# Patient Record
Sex: MALE | Race: WHITE | NOT HISPANIC OR LATINO | Employment: OTHER | URBAN - METROPOLITAN AREA
[De-identification: names, ages, dates, MRNs, and addresses within clinical notes are randomized per-mention and may not be internally consistent; named-entity substitution may affect disease eponyms.]

---

## 2021-01-25 NOTE — PATIENT INSTRUCTIONS
Health Maintenance Due   Topic Date Due   • COLONOSCOPY  1968   • ANNUAL PHYSICAL  10/25/1971   • TDAP/TD VACCINES (1 - Tdap) 10/25/1987   • ZOSTER VACCINE (1 of 2) 10/25/2018   • INFLUENZA VACCINE  08/01/2020   • HEPATITIS C SCREENING  01/25/2021     Ask wife  Where flu shot given.      Take Zinc 1 hour before or two hours after other meds.  Stop Ibuprofen till   EGD

## 2021-01-26 ENCOUNTER — OFFICE VISIT (OUTPATIENT)
Dept: FAMILY MEDICINE CLINIC | Facility: CLINIC | Age: 51
End: 2021-01-26

## 2021-01-26 VITALS
HEIGHT: 72 IN | DIASTOLIC BLOOD PRESSURE: 81 MMHG | WEIGHT: 246 LBS | TEMPERATURE: 97.1 F | OXYGEN SATURATION: 94 % | HEART RATE: 83 BPM | BODY MASS INDEX: 33.32 KG/M2 | SYSTOLIC BLOOD PRESSURE: 119 MMHG

## 2021-01-26 DIAGNOSIS — Z00.01 ENCOUNTER FOR ANNUAL GENERAL MEDICAL EXAMINATION WITH ABNORMAL FINDINGS IN ADULT: Primary | ICD-10-CM

## 2021-01-26 DIAGNOSIS — Z12.11 SCREENING FOR COLON CANCER: ICD-10-CM

## 2021-01-26 DIAGNOSIS — Z12.11 COLON CANCER SCREENING: ICD-10-CM

## 2021-01-26 DIAGNOSIS — I15.9 SECONDARY HYPERTENSION: ICD-10-CM

## 2021-01-26 DIAGNOSIS — R68.82 LIBIDO, DECREASED: ICD-10-CM

## 2021-01-26 DIAGNOSIS — M1A.0790 CHRONIC GOUT OF FOOT, UNSPECIFIED CAUSE, UNSPECIFIED LATERALITY: ICD-10-CM

## 2021-01-26 DIAGNOSIS — Z13.1 SCREENING FOR DIABETES MELLITUS: ICD-10-CM

## 2021-01-26 DIAGNOSIS — F19.10 SUBSTANCE ABUSE (HCC): ICD-10-CM

## 2021-01-26 DIAGNOSIS — Z13.220 SCREENING CHOLESTEROL LEVEL: ICD-10-CM

## 2021-01-26 DIAGNOSIS — E55.9 VITAMIN D DEFICIENCY: ICD-10-CM

## 2021-01-26 DIAGNOSIS — K21.9 GASTROESOPHAGEAL REFLUX DISEASE, UNSPECIFIED WHETHER ESOPHAGITIS PRESENT: ICD-10-CM

## 2021-01-26 DIAGNOSIS — E11.9 DM TYPE 2, GOAL A1C TO BE DETERMINED (HCC): ICD-10-CM

## 2021-01-26 PROBLEM — M10.9 GOUT OF FOOT: Status: ACTIVE | Noted: 2021-01-26

## 2021-01-26 LAB
25(OH)D3 SERPL-MCNC: 26.2 NG/ML (ref 30–100)
ALBUMIN SERPL-MCNC: 4.4 G/DL (ref 3.5–5.2)
ALBUMIN/GLOB SERPL: 1.2 G/DL
ALP SERPL-CCNC: 144 U/L (ref 39–117)
ALT SERPL W P-5'-P-CCNC: 20 U/L (ref 1–41)
ANION GAP SERPL CALCULATED.3IONS-SCNC: 9.6 MMOL/L (ref 5–15)
AST SERPL-CCNC: 18 U/L (ref 1–40)
BASOPHILS # BLD AUTO: 0.07 10*3/MM3 (ref 0–0.2)
BASOPHILS NFR BLD AUTO: 0.9 % (ref 0–1.5)
BILIRUB SERPL-MCNC: 0.3 MG/DL (ref 0–1.2)
BUN SERPL-MCNC: 11 MG/DL (ref 6–20)
BUN/CREAT SERPL: 11.5 (ref 7–25)
CALCIUM SPEC-SCNC: 9.8 MG/DL (ref 8.6–10.5)
CHLORIDE SERPL-SCNC: 105 MMOL/L (ref 98–107)
CHOLEST SERPL-MCNC: 221 MG/DL (ref 0–200)
CO2 SERPL-SCNC: 26.4 MMOL/L (ref 22–29)
CREAT SERPL-MCNC: 0.96 MG/DL (ref 0.76–1.27)
DEPRECATED RDW RBC AUTO: 43.8 FL (ref 37–54)
EOSINOPHIL # BLD AUTO: 0.32 10*3/MM3 (ref 0–0.4)
EOSINOPHIL NFR BLD AUTO: 4.3 % (ref 0.3–6.2)
ERYTHROCYTE [DISTWIDTH] IN BLOOD BY AUTOMATED COUNT: 13.9 % (ref 12.3–15.4)
GFR SERPL CREATININE-BSD FRML MDRD: 82 ML/MIN/1.73
GLOBULIN UR ELPH-MCNC: 3.6 GM/DL
GLUCOSE SERPL-MCNC: 104 MG/DL (ref 65–99)
HCT VFR BLD AUTO: 48.7 % (ref 37.5–51)
HDLC SERPL-MCNC: 39 MG/DL (ref 40–60)
HGB BLD-MCNC: 16.3 G/DL (ref 13–17.7)
IMM GRANULOCYTES # BLD AUTO: 0.02 10*3/MM3 (ref 0–0.05)
IMM GRANULOCYTES NFR BLD AUTO: 0.3 % (ref 0–0.5)
LDLC SERPL CALC-MCNC: 150 MG/DL (ref 0–100)
LDLC/HDLC SERPL: 3.75 {RATIO}
LYMPHOCYTES # BLD AUTO: 2.04 10*3/MM3 (ref 0.7–3.1)
LYMPHOCYTES NFR BLD AUTO: 27.2 % (ref 19.6–45.3)
MAGNESIUM SERPL-MCNC: 2.2 MG/DL (ref 1.6–2.6)
MCH RBC QN AUTO: 29.1 PG (ref 26.6–33)
MCHC RBC AUTO-ENTMCNC: 33.5 G/DL (ref 31.5–35.7)
MCV RBC AUTO: 87 FL (ref 79–97)
MONOCYTES # BLD AUTO: 0.66 10*3/MM3 (ref 0.1–0.9)
MONOCYTES NFR BLD AUTO: 8.8 % (ref 5–12)
NEUTROPHILS NFR BLD AUTO: 4.4 10*3/MM3 (ref 1.7–7)
NEUTROPHILS NFR BLD AUTO: 58.5 % (ref 42.7–76)
NRBC BLD AUTO-RTO: 0 /100 WBC (ref 0–0.2)
PLATELET # BLD AUTO: 349 10*3/MM3 (ref 140–450)
PMV BLD AUTO: 10.8 FL (ref 6–12)
POTASSIUM SERPL-SCNC: 4.6 MMOL/L (ref 3.5–5.2)
PROT SERPL-MCNC: 8 G/DL (ref 6–8.5)
RBC # BLD AUTO: 5.6 10*6/MM3 (ref 4.14–5.8)
SODIUM SERPL-SCNC: 141 MMOL/L (ref 136–145)
TESTOST SERPL-MCNC: 341 NG/DL (ref 193–740)
TRIGL SERPL-MCNC: 178 MG/DL (ref 0–150)
TSH SERPL DL<=0.05 MIU/L-ACNC: 3.15 UIU/ML (ref 0.27–4.2)
VIT B12 BLD-MCNC: 686 PG/ML (ref 211–946)
VLDLC SERPL-MCNC: 32 MG/DL (ref 5–40)
WBC # BLD AUTO: 7.51 10*3/MM3 (ref 3.4–10.8)

## 2021-01-26 PROCEDURE — 83036 HEMOGLOBIN GLYCOSYLATED A1C: CPT | Performed by: PREVENTIVE MEDICINE

## 2021-01-26 PROCEDURE — 80061 LIPID PANEL: CPT | Performed by: PREVENTIVE MEDICINE

## 2021-01-26 PROCEDURE — 80053 COMPREHEN METABOLIC PANEL: CPT | Performed by: PREVENTIVE MEDICINE

## 2021-01-26 PROCEDURE — 82570 ASSAY OF URINE CREATININE: CPT | Performed by: PREVENTIVE MEDICINE

## 2021-01-26 PROCEDURE — 85025 COMPLETE CBC W/AUTO DIFF WBC: CPT | Performed by: PREVENTIVE MEDICINE

## 2021-01-26 PROCEDURE — 83735 ASSAY OF MAGNESIUM: CPT | Performed by: PREVENTIVE MEDICINE

## 2021-01-26 PROCEDURE — 82043 UR ALBUMIN QUANTITATIVE: CPT | Performed by: PREVENTIVE MEDICINE

## 2021-01-26 PROCEDURE — 82306 VITAMIN D 25 HYDROXY: CPT | Performed by: PREVENTIVE MEDICINE

## 2021-01-26 PROCEDURE — 99214 OFFICE O/P EST MOD 30 MIN: CPT | Performed by: PREVENTIVE MEDICINE

## 2021-01-26 PROCEDURE — 99386 PREV VISIT NEW AGE 40-64: CPT | Performed by: PREVENTIVE MEDICINE

## 2021-01-26 PROCEDURE — 84443 ASSAY THYROID STIM HORMONE: CPT | Performed by: PREVENTIVE MEDICINE

## 2021-01-26 PROCEDURE — 84403 ASSAY OF TOTAL TESTOSTERONE: CPT | Performed by: PREVENTIVE MEDICINE

## 2021-01-26 PROCEDURE — 82607 VITAMIN B-12: CPT | Performed by: PREVENTIVE MEDICINE

## 2021-01-26 RX ORDER — ALLOPURINOL 100 MG/1
TABLET ORAL
COMMUNITY
Start: 2020-10-22 | End: 2021-10-12 | Stop reason: SDUPTHER

## 2021-01-26 RX ORDER — DAPAGLIFLOZIN 10 MG/1
TABLET, FILM COATED ORAL
COMMUNITY
Start: 2021-01-17 | End: 2021-04-11 | Stop reason: SDUPTHER

## 2021-01-26 RX ORDER — ASPIRIN 81 MG/1
81 TABLET ORAL DAILY
COMMUNITY
End: 2022-02-01

## 2021-01-26 RX ORDER — AMLODIPINE BESYLATE 10 MG/1
10 TABLET ORAL DAILY
Qty: 90 TABLET | Refills: 1 | Status: SHIPPED | OUTPATIENT
Start: 2021-01-26 | End: 2021-07-23

## 2021-01-26 RX ORDER — AMLODIPINE BESYLATE 10 MG/1
TABLET ORAL
COMMUNITY
Start: 2020-10-28 | End: 2021-01-26 | Stop reason: SDUPTHER

## 2021-01-26 RX ORDER — MULTIVIT WITH MINERALS/LUTEIN
500 TABLET ORAL DAILY
COMMUNITY
End: 2021-04-15

## 2021-01-26 RX ORDER — CLONIDINE HYDROCHLORIDE 0.1 MG/1
TABLET ORAL
COMMUNITY
Start: 2020-10-28

## 2021-01-26 RX ORDER — IBUPROFEN 600 MG/1
TABLET ORAL
COMMUNITY
Start: 2020-10-29 | End: 2021-01-26

## 2021-01-26 RX ORDER — OMEPRAZOLE 40 MG/1
CAPSULE, DELAYED RELEASE ORAL
COMMUNITY
Start: 2020-10-22 | End: 2021-01-28 | Stop reason: SDUPTHER

## 2021-01-26 RX ORDER — SILDENAFIL 25 MG/1
TABLET, FILM COATED ORAL
COMMUNITY
Start: 2020-12-28 | End: 2021-10-08

## 2021-01-26 RX ORDER — AMLODIPINE BESYLATE 5 MG/1
TABLET ORAL
COMMUNITY
Start: 2020-10-22 | End: 2021-01-26

## 2021-01-26 NOTE — PROGRESS NOTES
"Brenda Zelaya is a 50 y.o. male presents for   Chief Complaint   Patient presents with   • Establish Care       Health Maintenance Due   Topic Date Due   • URINE MICROALBUMIN  1968   • COLONOSCOPY  1968   • ANNUAL PHYSICAL  10/25/1971   • Pneumococcal Vaccine 0-64 (1 of 1 - PPSV23) 10/25/1974   • Hepatitis B (1 of 3 - Risk 3-dose series) 10/25/1987   • TDAP/TD VACCINES (1 - Tdap) 10/25/1987   • ZOSTER VACCINE (1 of 2) 10/25/2018   • INFLUENZA VACCINE  08/01/2020   • DIABETIC FOOT EXAM  01/25/2021   • HEMOGLOBIN A1C  01/25/2021   • DIABETIC EYE EXAM  01/25/2021       50-year-old white male new patient states that he has been addicted to multiple substances.  He states that he has undergone inpatient treatment for a month last during the year last year and he is no longer using he did overdosed twice on heroin last year.  He is unclear whether or not he is type II diabetic.  He has had persistent problems with esophagitis.  He does take ibuprofen on a routine basis and has been cautioned not to do this.  He has agreed to colonoscopy and EGD and will undergo blood work today.  Patient has been told to wear his seatbelt and to use sunscreen.       Vitals:    01/26/21 0940 01/26/21 0942   BP: 132/79 119/81   BP Location: Right arm Left arm   Patient Position: Sitting Sitting   Cuff Size: Adult Adult   Pulse: 83    Temp: 97.1 °F (36.2 °C)    SpO2: 94%    Weight: 112 kg (246 lb)    Height: 182 cm (71.65\")      Body mass index is 33.69 kg/m².    Current Outpatient Medications on File Prior to Visit   Medication Sig Dispense Refill   • aspirin 81 MG EC tablet Take 81 mg by mouth Daily.     • vitamin C (ASCORBIC ACID) 250 MG tablet Take 500 mg by mouth Daily.     • Zinc 50 MG capsule Take  by mouth.     • allopurinol (ZYLOPRIM) 100 MG tablet      • cloNIDine (CATAPRES) 0.1 MG tablet      • Farxiga 10 MG tablet      • omeprazole (priLOSEC) 40 MG capsule      • sildenafil (VIAGRA) 25 MG tablet TAKE 2 TO " 4 TABLETS BY MOUTH AS DIRECTED     • [DISCONTINUED] amLODIPine (NORVASC) 10 MG tablet      • [DISCONTINUED] amLODIPine (NORVASC) 5 MG tablet      • [DISCONTINUED] ibuprofen (ADVIL,MOTRIN) 600 MG tablet        No current facility-administered medications on file prior to visit.        The following portions of the patient's history were reviewed and updated as appropriate: allergies, current medications, past family history, past medical history, past social history, past surgical history and problem list.    Review of Systems   Constitutional: Negative.    HENT: Negative.  Negative for sinus pressure and sore throat.    Eyes: Negative.    Respiratory: Negative.  Negative for cough.    Cardiovascular: Negative.    Gastrointestinal: Negative.  Positive for GERD and indigestion.   Endocrine: Negative.    Genitourinary: Positive for decreased libido.   Musculoskeletal: Negative.    Skin: Negative.    Allergic/Immunologic: Positive for environmental allergies.   Neurological: Negative.    Hematological: Negative.    Psychiatric/Behavioral: Negative.        Objective   Physical Exam  Vitals signs reviewed.   Constitutional:       General: He is not in acute distress.     Appearance: He is well-developed. He is obese. He is not ill-appearing or toxic-appearing.   HENT:      Head: Normocephalic and atraumatic.      Right Ear: Tympanic membrane, ear canal and external ear normal.      Left Ear: Tympanic membrane, ear canal and external ear normal.      Nose: Nose normal.   Eyes:      Extraocular Movements: Extraocular movements intact.      Conjunctiva/sclera: Conjunctivae normal.      Pupils: Pupils are equal, round, and reactive to light.   Neck:      Musculoskeletal: Neck supple.   Cardiovascular:      Rate and Rhythm: Normal rate and regular rhythm.      Heart sounds: Normal heart sounds.   Pulmonary:      Effort: Pulmonary effort is normal.      Breath sounds: Normal breath sounds.   Abdominal:      General: Bowel  sounds are normal. There is no distension.      Palpations: Abdomen is soft. There is no mass.      Tenderness: There is no abdominal tenderness.   Musculoskeletal: Normal range of motion.   Skin:     General: Skin is warm.   Neurological:      General: No focal deficit present.      Mental Status: He is alert and oriented to person, place, and time.   Psychiatric:         Mood and Affect: Mood normal.         Behavior: Behavior normal.       PHQ-9 Total Score: 0    Assessment/Plan   Diagnoses and all orders for this visit:    1. Encounter for annual general medical examination with abnormal findings in adult (Primary)  Comments:  Advised to wear sunscreen and seatbelt.  Orders:  -     CBC Auto Differential    2. Screening for colon cancer  Comments:  no family history but having GERD and needs EGD so will do colonoscopy    3. Screening cholesterol level  Comments:  Patient has not been told that he has had high cholesterol in the past.  Orders:  -     Lipid Panel    4. Screening for diabetes mellitus  Comments:  Patient does have what he calls type 2 diabetes.  Orders:  -     Comprehensive Metabolic Panel    5. Vitamin D deficiency  Comments:  Patient has not had his vitamin D checked in over a year.  Orders:  -     Vitamin D 25 Hydroxy    6. DM type 2, goal A1C to be determined (CMS/[facility])  Comments:  Patient states that he does have type 2 diabetes and takes Farxiga for that.  Patient states that he was on Metformin but that caused him to have a positive gunjan  Orders:  -     Hemoglobin A1c  -     Microalbumin / Creatinine Urine Ratio - Urine, Clean Catch    7. Secondary hypertension  Comments:  Blood pressure is under fair control.  Patient states that he does not have any headache or chest pain.  Patient will home monitor.    8. Gastroesophageal reflux disease, unspecified whether esophagitis present  Comments:  Patient states that his heartburn is under fair control with omeprazole.  He does agree to and  EGD.  Orders:  -     Ambulatory referral for Screening EGD    9. Libido, decreased  Comments:  Patient states that his loss of libido has been going on for approximately a year and he would like his testosterone checked.  Orders:  -     Testosterone; Future  -     TSH  -     Vitamin B12  -     Magnesium  -     Testosterone    10. Colon cancer screening  -     Ambulatory Referral For Screening Colonoscopy    11. 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  He does admit to multiple types of substance abuse.  Patient was treated in an inpatient     12. Chronic gout of foot, unspecified cause, unspecified laterality  Comments:  Patient states that he has not had problems with gout in several years.    Other orders  -     Cancel: HIV-1 & HIV-2 Antibodies  -     Cancel: Hepatitis C Antibody  -     Cancel: Cologuard - Stool, Per Rectum; Future  -     amLODIPine (NORVASC) 10 MG tablet; Take 1 tablet by mouth Daily.  Dispense: 90 tablet; Refill: 1        Patient Instructions     Health Maintenance Due   Topic Date Due   • COLONOSCOPY  1968   • ANNUAL PHYSICAL  10/25/1971   • TDAP/TD VACCINES (1 - Tdap) 10/25/1987   • ZOSTER VACCINE (1 of 2) 10/25/2018   • INFLUENZA VACCINE  08/01/2020   • HEPATITIS C SCREENING  01/25/2021     Ask wife  Where flu shot given.      Take Zinc 1 hour before or two hours after other meds.  Stop Ibuprofen till   EGD

## 2021-01-27 ENCOUNTER — TELEPHONE (OUTPATIENT)
Dept: FAMILY MEDICINE CLINIC | Facility: CLINIC | Age: 51
End: 2021-01-27

## 2021-01-27 LAB
ALBUMIN UR-MCNC: <1.2 MG/DL
CREAT UR-MCNC: 104.4 MG/DL
HBA1C MFR BLD: 6.1 % (ref 3.5–5.6)
MICROALBUMIN/CREAT UR: NORMAL MG/G{CREAT}

## 2021-01-27 NOTE — TELEPHONE ENCOUNTER
Glucose 104 with A1C showing good control at 6.1.  If notes low sugars, may need to cut Farxiga dose.  Testosterone is normal but Vitamin D is low so start or increase otc dose.  Alk phos is elevated so we'll continue to moniter and may need to workup if increases(it is blood, liver, and bone enzyme).  Cholesterol is elevated and he can consider a statin or try to get to goal of total below 200 and LDL below 70 by limiting sat fats and walking.  Standard of care would advise statin while watching liver function.

## 2021-01-28 RX ORDER — OMEPRAZOLE 40 MG/1
40 CAPSULE, DELAYED RELEASE ORAL DAILY
Qty: 90 CAPSULE | Refills: 1 | Status: SHIPPED | OUTPATIENT
Start: 2021-01-28 | End: 2021-07-19

## 2021-04-12 RX ORDER — DAPAGLIFLOZIN 10 MG/1
10 TABLET, FILM COATED ORAL DAILY
Qty: 90 TABLET | Refills: 2 | Status: SHIPPED | OUTPATIENT
Start: 2021-04-12 | End: 2021-10-25

## 2021-04-14 DIAGNOSIS — E11.9 DM TYPE 2, GOAL A1C TO BE DETERMINED (HCC): Primary | ICD-10-CM

## 2021-04-14 RX ORDER — DAPAGLIFLOZIN 10 MG/1
10 TABLET, FILM COATED ORAL DAILY
Qty: 90 TABLET | Refills: 2 | Status: CANCELLED | OUTPATIENT
Start: 2021-04-14

## 2021-04-14 NOTE — TELEPHONE ENCOUNTER
Pharmacy Name:  [id number]     Pharmacy representative name: JORDON    Pharmacy representative phone number: [phone]    What medication are you calling in regards to:   Farxiga 10 MG tablet  10 mg, Daily 2 ordered         Summary: Take 10 mg by mouth Daily., Starting Mon 4/12/2021, Normal            What question does the pharmacy have: WHEN WILL THE PRESCRIPTION BE SENT TO THE PHARMACY    Who is the provider that prescribed the medication: Ruth Owen MD    Additional notes: PHARMACY CALLED TO CHECK THE STATUS OF THE PRESCRIPTION BEING SENT TO THEM TO FILL PATIENT MEDICATION.      THANKS

## 2021-04-15 ENCOUNTER — OFFICE VISIT (OUTPATIENT)
Dept: FAMILY MEDICINE CLINIC | Facility: CLINIC | Age: 51
End: 2021-04-15

## 2021-04-15 VITALS — TEMPERATURE: 101 F

## 2021-04-15 DIAGNOSIS — R50.9 FEVER, UNSPECIFIED FEVER CAUSE: ICD-10-CM

## 2021-04-15 DIAGNOSIS — R05.9 COUGH: Primary | ICD-10-CM

## 2021-04-15 PROCEDURE — U0003 INFECTIOUS AGENT DETECTION BY NUCLEIC ACID (DNA OR RNA); SEVERE ACUTE RESPIRATORY SYNDROME CORONAVIRUS 2 (SARS-COV-2) (CORONAVIRUS DISEASE [COVID-19]), AMPLIFIED PROBE TECHNIQUE, MAKING USE OF HIGH THROUGHPUT TECHNOLOGIES AS DESCRIBED BY CMS-2020-01-R: HCPCS | Performed by: PREVENTIVE MEDICINE

## 2021-04-15 PROCEDURE — 99441 PR PHYS/QHP TELEPHONE EVALUATION 5-10 MIN: CPT | Performed by: PREVENTIVE MEDICINE

## 2021-04-15 RX ORDER — BENZONATATE 100 MG/1
100 CAPSULE ORAL 3 TIMES DAILY PRN
Qty: 30 CAPSULE | Refills: 0 | Status: SHIPPED | OUTPATIENT
Start: 2021-04-15 | End: 2021-10-08

## 2021-04-15 NOTE — PROGRESS NOTES
Brenda Zelaya is a 50 y.o. male presents for   Chief Complaint   Patient presents with   • Cough   • Sore Throat   • Fever       Health Maintenance Due   Topic Date Due   • COLONOSCOPY  Never done   • Pneumococcal Vaccine 0-64 (1 of 1 - PPSV23) Never done   • COVID-19 Vaccine (1) Never done   • Hepatitis B (1 of 3 - Risk 3-dose series) Never done   • TDAP/TD VACCINES (1 - Tdap) Never done   • ZOSTER VACCINE (1 of 2) Never done   • DIABETIC FOOT EXAM  Never done   • DIABETIC EYE EXAM  Never done       You have chosen to receive care through a telephone visit. Do you consent to use a telephone visit for your medical care today? Yes    Spent 5 minutes on phone and 5 minutes in charting.  Patient awoke with a scratchy throat 2 days ago and this is developed into a cough with clear sputum today as well as sinus drainage and a new temperature of 101 wife is ill with a scratchy throat as well patient does get spring allergies this time of year he did receive the maternal vaccination second shot at the beginning of March he has lost his sense of taste and smell just today and has had some wheezing in his chest he would like something to help suppress his cough.      Cough  Associated symptoms include a fever, rhinorrhea, a sore throat and wheezing.   Sore Throat   Associated symptoms include congestion and coughing.   Fever   Associated symptoms include congestion, coughing, a sore throat and wheezing.        Vitals:    04/15/21 1243   Temp: (!) 101 °F (38.3 °C)     There is no height or weight on file to calculate BMI.    Current Outpatient Medications on File Prior to Visit   Medication Sig Dispense Refill   • allopurinol (ZYLOPRIM) 100 MG tablet      • amLODIPine (NORVASC) 10 MG tablet Take 1 tablet by mouth Daily. 90 tablet 1   • aspirin 81 MG EC tablet Take 81 mg by mouth Daily.     • cloNIDine (CATAPRES) 0.1 MG tablet      • Farxiga 10 MG tablet Take 10 mg by mouth Daily. 90 tablet 2   • omeprazole  (priLOSEC) 40 MG capsule Take 1 capsule by mouth Daily. 90 capsule 1   • sildenafil (VIAGRA) 25 MG tablet TAKE 2 TO 4 TABLETS BY MOUTH AS DIRECTED     • [DISCONTINUED] vitamin C (ASCORBIC ACID) 250 MG tablet Take 500 mg by mouth Daily.     • [DISCONTINUED] Zinc 50 MG capsule Take  by mouth.       No current facility-administered medications on file prior to visit.       The following portions of the patient's history were reviewed and updated as appropriate: allergies, current medications, past family history, past medical history, past social history, past surgical history and problem list.    Review of Systems   Constitutional: Positive for fever.   HENT: Positive for congestion, rhinorrhea, sinus pressure and sore throat.    Respiratory: Positive for cough and wheezing.        Objective   Physical Exam  Vitals reviewed.   Constitutional:       General: He is not in acute distress.     Appearance: He is well-developed. He is not ill-appearing or toxic-appearing.   Pulmonary:      Effort: Pulmonary effort is normal.   Abdominal:      General: There is no distension.      Palpations: There is no mass.      Tenderness: There is no abdominal tenderness.   Neurological:      Mental Status: He is alert.   Psychiatric:         Mood and Affect: Mood normal.       PHQ-9 Total Score:      Assessment/Plan   Diagnoses and all orders for this visit:    1. Cough (Primary)  Comments:  Advised Tessalon Perles rest and plenty of fluids call if breathing worsens  Orders:  -     COVID-19,[facility] ROUTINE, NP/OP SWAB IN TRANSPORT MEDIA OR ESWAB 72 HR TAT - Swab, Nasopharynx    2. Fever, unspecified fever cause  Comments:  May take Tylenol or ibuprofen to control fever    Other orders  -     benzonatate (Tessalon Perles) 100 MG capsule; Take 1 capsule by mouth 3 (Three) Times a Day As Needed for Cough.  Dispense: 30 capsule; Refill: 0        Patient Instructions   How to Quarantine at Home  Information for Patients and  Families    These instructions are for people with confirmed or suspected COVID-19 who do not need to be hospitalized and those with confirmed COVID-19 who were hospitalized and discharged to care for themselves at home.    If you were tested through the Health Department  The Health Department will monitor your wellbeing.  If it is determined that you do not need to be hospitalized and can be isolated at home, you will be monitored by staff from your local or state health department.     If you were tested through a Commercial Lab  You will need to monitor yourself and report changes in your symptoms to your doctor.  See the section below called Monitor Your Symptoms.    Follow these steps until a healthcare provider or local or state health department says you can return to your normal activities.    Stay home except to get medical care  • Restrict activities outside your home, except for getting medical care.   • Do not go to work, school, or public areas.   • Avoid using public transportation, ride-sharing, or taxis.    Separate yourself from other people and animals in your home  People  As much as possible, you should stay in a specific room and away from other people in your home. Also, you should use a separate bathroom, if available.    Animals  You should restrict contact with pets and other animals while you are sick with COVID-19, just like you would around other people. When possible, have another member of your household care for your animals while you are sick. If you are sick with COVID-19, avoid contact with your pet, including petting, snuggling, being kissed or licked, and sharing food. If you must care for your pet or be around animals while you are sick, wash your hands before and after you interact with pets and wear a facemask. See COVID-19 and Animals for more information.    Call ahead before visiting your doctor  If you have a medical appointment, call the healthcare provider and tell them  that you have or may have COVID-19. This information will help the healthcare provider’s office take steps to keep other people from getting infected or exposed.    Wear a facemask  You should wear a facemask when you are around other people (e.g., sharing a room or vehicle) or pets and before you enter a healthcare provider’s office.     If you are not able to wear a facemask (for example, because it causes trouble breathing), then people who live with you should not stay in the same room with you, or they should wear a facemask if they enter your room.    Cover your coughs and sneezes  • Cover your mouth and nose with a tissue when you cough or sneeze.   • Throw used tissues in a lined trash can.   • Immediately wash your hands with soap and water for at least 20 seconds or, if soap and water are not available, clean your hands with an alcohol-based hand  that contains at least 60% alcohol.    Clean your hands often  • Wash your hands often with soap and water for at least 20 seconds, especially after blowing your nose, coughing, or sneezing; going to the bathroom; and before eating or preparing food.     • If soap and water are not readily available, use an alcohol-based hand  with at least 60% alcohol, covering all surfaces of your hands and rubbing them together until they feel dry.    • Soap and water are the best option if hands are visibly dirty. Avoid touching your eyes, nose, and mouth with unwashed hands.    Avoid sharing personal household items  • You should not share dishes, drinking glasses, cups, eating utensils, towels, or bedding with other people or pets in your home.   • After using these items, they should be washed thoroughly with soap and water.    Clean all “high-touch” surfaces everyday  • High touch surfaces include counters, tabletops, doorknobs, bathroom fixtures, toilets, phones, keyboards, tablets, and bedside tables.   • Also, clean any surfaces that may have blood,  stool, or body fluids on them.   • Use a household cleaning spray or wipe, according to the label instructions. Labels contain instructions for safe and effective use of the cleaning product, including precautions you should take when applying the product, such as wearing gloves and making sure you have good ventilation during use of the product.    Monitor your symptoms  • Seek prompt medical attention if your illness is worsening (e.g., difficulty breathing).   • Before seeking care, call your healthcare provider and tell them that you have, or are being evaluated for, COVID-19.   • Put on a facemask before you enter the facility.     • These steps will help the healthcare provider’s office to keep other people in the office or waiting room from getting infected or exposed.   • Persons who are placed under active monitoring or facilitated self-monitoring should follow instructions provided by their local health department or occupational health professionals, as appropriate.  • If you have a medical emergency and need to call 911, notify the dispatch personnel that you have, or are being evaluated for COVID-19. If possible, put on a facemask before emergency medical services arrive.    Discontinuing home isolation  Patients with confirmed COVID-19 should remain under home isolation precautions until the risk of secondary transmission to others is thought to be low. The decision to discontinue home isolation precautions should be made on a case-by-case basis, in consultation with healthcare providers and state and local health departments.    The below content are for household members, intimate partners, and caregivers of a patient with symptomatic laboratory-confirmed COVID-19 or a patient under investigation:    Household members, intimate partners, and caregivers may have close contact with a person with symptomatic, laboratory-confirmed COVID-19 or a person under investigation.     Close contacts should  monitor their health; they should call their healthcare provider right away if they develop symptoms suggestive of COVID-19 (e.g., fever, cough, shortness of breath)     Close contacts should also follow these recommendations:  • Make sure that you understand and can help the patient follow their healthcare provider’s instructions for medication(s) and care. You should help the patient with basic needs in the home and provide support for getting groceries, prescriptions, and other personal needs.  • Monitor the patient’s symptoms. If the patient is getting sicker, call his or her healthcare provider and tell them that the patient has laboratory-confirmed COVID-19. This will help the healthcare provider’s office take steps to keep other people in the office or waiting room from getting infected. Ask the healthcare provider to call the local or state health department for additional guidance. If the patient has a medical emergency and you need to call 911, notify the dispatch personnel that the patient has, or is being evaluated for COVID-19.  • Household members should stay in another room or be  from the patient as much as possible. Household members should use a separate bedroom and bathroom, if available.  • Prohibit visitors who do not have an essential need to be in the home.  • Household members should care for any pets in the home. Do not handle pets or other animals while sick.  For more information, see COVID-19 and Animals.  • Make sure that shared spaces in the home have good air flow, such as by an air conditioner or an opened window, weather permitting.  • Perform hand hygiene frequently. Wash your hands often with soap and water for at least 20 seconds or use an alcohol-based hand  that contains 60 to 95% alcohol, covering all surfaces of your hands and rubbing them together until they feel dry. Soap and water should be used preferentially if hands are visibly dirty.  • Avoid touching  your eyes, nose, and mouth with unwashed hands.  • The patient should wear a facemask when you are around other people. If the patient is not able to wear a facemask (for example, because it causes trouble breathing), you, as the caregiver, should wear a mask when you are in the same room as the patient.  • Wear a disposable facemask and gloves when you touch or have contact with the patient’s blood, stool, or body fluids, such as saliva, sputum, nasal mucus, vomit, or urine.   o Throw out disposable facemasks and gloves after using them. Do not reuse.  o When removing personal protective equipment, first remove and dispose of gloves. Then, immediately clean your hands with soap and water or alcohol-based hand . Next, remove and dispose of facemask, and immediately clean your hands again with soap and water or alcohol-based hand .  • Avoid sharing household items with the patient. You should not share dishes, drinking glasses, cups, eating utensils, towels, bedding, or other items. After the patient uses these items, you should wash them thoroughly (see below “Wash laundry thoroughly”).  • Clean all “high-touch” surfaces, such as counters, tabletops, doorknobs, bathroom fixtures, toilets, phones, keyboards, tablets, and bedside tables, every day. Also, clean any surfaces that may have blood, stool, or body fluids on them.   o Use a household cleaning spray or wipe, according to the label instructions. Labels contain instructions for safe and effective use of the cleaning product including precautions you should take when applying the product, such as wearing gloves and making sure you have good ventilation during use of the product.  • Wash laundry thoroughly.   o Immediately remove and wash clothes or bedding that have blood, stool, or body fluids on them.  o Wear disposable gloves while handling soiled items and keep soiled items away from your body. Clean your hands (with soap and water or an  alcohol-based hand ) immediately after removing your gloves.  o Read and follow directions on labels of laundry or clothing items and detergent. In general, using a normal laundry detergent according to washing machine instructions and dry thoroughly using the warmest temperatures recommended on the clothing label.  • Place all used disposable gloves, facemasks, and other contaminated items in a lined container before disposing of them with other household waste. Clean your hands (with soap and water or an alcohol-based hand ) immediately after handling these items. Soap and water should be used preferentially if hands are visibly dirty.  • Discuss any additional questions with your state or local health department or healthcare provider.    Adapted from information provided by the Centers for Disease Control and Prevention.  For more information, visit https://www.cdc.gov/coronavirus/2019-ncov/hcp/guidance-prevent-spread.html    If cough or breathing worsens-call 911 and get to [place]

## 2021-04-15 NOTE — PATIENT INSTRUCTIONS

## 2021-04-16 LAB
LABCORP SARS-COV-2, NAA 2 DAY TAT: NORMAL
SARS-COV-2 RNA RESP QL NAA+PROBE: NOT DETECTED

## 2021-04-19 ENCOUNTER — TELEPHONE (OUTPATIENT)
Dept: FAMILY MEDICINE CLINIC | Facility: CLINIC | Age: 51
End: 2021-04-19

## 2021-04-19 NOTE — TELEPHONE ENCOUNTER
HUB TO READ  Covid test was negative.  If you have been exposed to Covid or have symptoms of Covid, continue to quarantine for 10 days or until free of fever without meds for 3 days. Consider getting second test toward end of that time before resuming usual activities-call if concern

## 2021-04-19 NOTE — TELEPHONE ENCOUNTER
I spoke with the patient about this.  He is feeling better and is sure it was allergies from this time of year

## 2021-06-30 ENCOUNTER — ANESTHESIA (OUTPATIENT)
Dept: GASTROENTEROLOGY | Facility: HOSPITAL | Age: 51
End: 2021-06-30

## 2021-06-30 ENCOUNTER — HOSPITAL ENCOUNTER (OUTPATIENT)
Facility: HOSPITAL | Age: 51
Setting detail: HOSPITAL OUTPATIENT SURGERY
Discharge: HOME | End: 2021-06-30
Attending: INTERNAL MEDICINE | Admitting: INTERNAL MEDICINE

## 2021-06-30 ENCOUNTER — ON CAMPUS - OUTPATIENT (OUTPATIENT)
Dept: URBAN - METROPOLITAN AREA HOSPITAL 85 | Facility: HOSPITAL | Age: 51
End: 2021-06-30
Payer: COMMERCIAL

## 2021-06-30 ENCOUNTER — ANESTHESIA EVENT (OUTPATIENT)
Dept: GASTROENTEROLOGY | Facility: HOSPITAL | Age: 51
End: 2021-06-30

## 2021-06-30 VITALS
TEMPERATURE: 98.6 F | DIASTOLIC BLOOD PRESSURE: 94 MMHG | WEIGHT: 250 LBS | BODY MASS INDEX: 33.86 KG/M2 | HEART RATE: 67 BPM | HEIGHT: 72 IN | RESPIRATION RATE: 15 BRPM | OXYGEN SATURATION: 97 % | SYSTOLIC BLOOD PRESSURE: 136 MMHG

## 2021-06-30 DIAGNOSIS — Z12.11 SCREEN FOR COLON CANCER: ICD-10-CM

## 2021-06-30 DIAGNOSIS — K62.1 RECTAL POLYP: ICD-10-CM

## 2021-06-30 DIAGNOSIS — Z12.11 ENCOUNTER FOR SCREENING FOR MALIGNANT NEOPLASM OF COLON: ICD-10-CM

## 2021-06-30 DIAGNOSIS — K57.30 DIVERTICULOSIS OF LARGE INTESTINE WITHOUT PERFORATION OR ABS: ICD-10-CM

## 2021-06-30 DIAGNOSIS — K64.8 OTHER HEMORRHOIDS: ICD-10-CM

## 2021-06-30 DIAGNOSIS — D12.5 BENIGN NEOPLASM OF SIGMOID COLON: ICD-10-CM

## 2021-06-30 PROCEDURE — 88305 TISSUE EXAM BY PATHOLOGIST: CPT | Performed by: INTERNAL MEDICINE

## 2021-06-30 PROCEDURE — 45385 COLONOSCOPY W/LESION REMOVAL: CPT | Mod: 33 | Performed by: INTERNAL MEDICINE

## 2021-06-30 PROCEDURE — 25010000002 PROPOFOL 200 MG/20ML EMULSION: Performed by: ANESTHESIOLOGIST ASSISTANT

## 2021-06-30 RX ORDER — PROPOFOL 10 MG/ML
INJECTION, EMULSION INTRAVENOUS AS NEEDED
Status: DISCONTINUED | OUTPATIENT
Start: 2021-06-30 | End: 2021-06-30 | Stop reason: SURG

## 2021-06-30 RX ORDER — LIDOCAINE HYDROCHLORIDE 20 MG/ML
INJECTION, SOLUTION EPIDURAL; INFILTRATION; INTRACAUDAL; PERINEURAL AS NEEDED
Status: DISCONTINUED | OUTPATIENT
Start: 2021-06-30 | End: 2021-06-30 | Stop reason: SURG

## 2021-06-30 RX ORDER — SODIUM CHLORIDE 9 MG/ML
INJECTION, SOLUTION INTRAVENOUS CONTINUOUS PRN
Status: DISCONTINUED | OUTPATIENT
Start: 2021-06-30 | End: 2021-06-30 | Stop reason: SURG

## 2021-06-30 RX ADMIN — PROPOFOL 50 MG: 10 INJECTION, EMULSION INTRAVENOUS at 11:11

## 2021-06-30 RX ADMIN — GLYCOPYRROLATE 0.2 MG: 0.2 INJECTION, SOLUTION INTRAMUSCULAR; INTRAVITREAL at 10:59

## 2021-06-30 RX ADMIN — PROPOFOL 80 MG: 10 INJECTION, EMULSION INTRAVENOUS at 11:07

## 2021-06-30 RX ADMIN — LIDOCAINE HYDROCHLORIDE 80 MG: 20 INJECTION, SOLUTION EPIDURAL; INFILTRATION; INTRACAUDAL; PERINEURAL at 11:07

## 2021-06-30 RX ADMIN — PROPOFOL 40 MG: 10 INJECTION, EMULSION INTRAVENOUS at 11:08

## 2021-06-30 RX ADMIN — PROPOFOL 80 MG: 10 INJECTION, EMULSION INTRAVENOUS at 11:16

## 2021-06-30 RX ADMIN — PROPOFOL 100 MG: 10 INJECTION, EMULSION INTRAVENOUS at 11:09

## 2021-06-30 RX ADMIN — SODIUM CHLORIDE: 0.9 INJECTION, SOLUTION INTRAVENOUS at 10:59

## 2021-06-30 RX ADMIN — PROPOFOL 50 MG: 10 INJECTION, EMULSION INTRAVENOUS at 11:14

## 2021-06-30 RX ADMIN — PROPOFOL 40 MG: 10 INJECTION, EMULSION INTRAVENOUS at 11:22

## 2021-06-30 NOTE — ANESTHESIA POSTPROCEDURE EVALUATION
Patient: Talon Zelaya    Procedure Summary     Date: 06/30/21 Room / Location: [facility] ENDOSCOPY 1 / [facility] ENDOSCOPY    Anesthesia Start: 1059 Anesthesia Stop: 1131    Procedure: COLONOSCOPY with polypectomy X4 (N/A ) Diagnosis:       Screen for colon cancer      (Screen for colon cancer [Z12.11])    Surgeons: Evin Winn MD Provider: Demetrius Miller MD    Anesthesia Type: MAC ASA Status: 2          Anesthesia Type: MAC    Vitals  Vitals Value Taken Time   /94 06/30/21 1152   Temp     Pulse 67 06/30/21 1153   Resp 15 06/30/21 1140   SpO2 97 % 06/30/21 1153   Vitals shown include unvalidated device data.        Post Anesthesia Care and Evaluation    Patient location during evaluation: PACU  Patient participation: complete - patient participated  Level of consciousness: awake  Pain scale: See nurse's notes for pain score.  Pain management: adequate  Airway patency: patent  Anesthetic complications: No anesthetic complications  PONV Status: none  Cardiovascular status: acceptable  Respiratory status: acceptable  Hydration status: acceptable    Comments: Patient seen and examined postoperatively; vital signs stable; SpO2 greater than or equal to 90%; cardiopulmonary status stable; nausea/vomiting adequately controlled; pain adequately controlled; no apparent anesthesia complications; patient discharged from anesthesia care when discharge criteria were met

## 2021-06-30 NOTE — ANESTHESIA PREPROCEDURE EVALUATION
Anesthesia Evaluation     Patient summary reviewed and Nursing notes reviewed   NPO Solid Status: > 8 hours  NPO Liquid Status: > 2 hours           Airway   Mallampati: II  TM distance: >3 FB  Neck ROM: full  No difficulty expected  Dental    (+) lower dentures    Pulmonary    Cardiovascular     (+) hypertension,       Neuro/Psych  GI/Hepatic/Renal/Endo    (+)  GERD,  diabetes mellitus,     Musculoskeletal     Abdominal    Substance History      OB/GYN          Other   arthritis,                      Anesthesia Plan    ASA 2     MAC     intravenous induction     Anesthetic plan, all risks, benefits, and alternatives have been provided, discussed and informed consent has been obtained with: patient.    Plan discussed with CAA.

## 2021-07-01 LAB
LAB AP CASE REPORT: NORMAL
PATH REPORT.FINAL DX SPEC: NORMAL
PATH REPORT.GROSS SPEC: NORMAL

## 2021-07-19 RX ORDER — OMEPRAZOLE 40 MG/1
40 CAPSULE, DELAYED RELEASE ORAL DAILY
Qty: 90 CAPSULE | Refills: 1 | Status: SHIPPED | OUTPATIENT
Start: 2021-07-19 | End: 2021-10-13

## 2021-07-23 RX ORDER — AMLODIPINE BESYLATE 10 MG/1
10 TABLET ORAL DAILY
Qty: 90 TABLET | Refills: 1 | Status: SHIPPED | OUTPATIENT
Start: 2021-07-23 | End: 2021-10-25

## 2021-10-01 ENCOUNTER — TRANSCRIBE ORDERS (OUTPATIENT)
Dept: ADMINISTRATIVE | Facility: HOSPITAL | Age: 51
End: 2021-10-01

## 2021-10-01 DIAGNOSIS — Z13.6 ENCOUNTER FOR SCREENING FOR VASCULAR DISEASE: Primary | ICD-10-CM

## 2021-10-08 ENCOUNTER — OFFICE VISIT (OUTPATIENT)
Dept: FAMILY MEDICINE CLINIC | Facility: CLINIC | Age: 51
End: 2021-10-08

## 2021-10-08 VITALS
SYSTOLIC BLOOD PRESSURE: 126 MMHG | BODY MASS INDEX: 34.37 KG/M2 | OXYGEN SATURATION: 96 % | HEART RATE: 76 BPM | TEMPERATURE: 98.2 F | WEIGHT: 253.4 LBS | DIASTOLIC BLOOD PRESSURE: 88 MMHG

## 2021-10-08 DIAGNOSIS — K21.9 GASTROESOPHAGEAL REFLUX DISEASE, UNSPECIFIED WHETHER ESOPHAGITIS PRESENT: ICD-10-CM

## 2021-10-08 DIAGNOSIS — M1A.0790 CHRONIC GOUT OF FOOT, UNSPECIFIED CAUSE, UNSPECIFIED LATERALITY: ICD-10-CM

## 2021-10-08 DIAGNOSIS — E66.09 CLASS 1 OBESITY DUE TO EXCESS CALORIES WITH SERIOUS COMORBIDITY AND BODY MASS INDEX (BMI) OF 34.0 TO 34.9 IN ADULT: ICD-10-CM

## 2021-10-08 DIAGNOSIS — F19.10 SUBSTANCE ABUSE (HCC): ICD-10-CM

## 2021-10-08 DIAGNOSIS — E55.9 VITAMIN D DEFICIENCY: ICD-10-CM

## 2021-10-08 DIAGNOSIS — I15.9 SECONDARY HYPERTENSION: ICD-10-CM

## 2021-10-08 DIAGNOSIS — E11.9 DM TYPE 2, GOAL A1C TO BE DETERMINED (HCC): Primary | ICD-10-CM

## 2021-10-08 DIAGNOSIS — E78.49 OTHER HYPERLIPIDEMIA: ICD-10-CM

## 2021-10-08 PROBLEM — E66.811 CLASS 1 OBESITY DUE TO EXCESS CALORIES WITH SERIOUS COMORBIDITY AND BODY MASS INDEX (BMI) OF 34.0 TO 34.9 IN ADULT: Status: ACTIVE | Noted: 2021-10-08

## 2021-10-08 PROBLEM — E66.9 OBESITY: Status: ACTIVE | Noted: 2021-10-08

## 2021-10-08 LAB
25(OH)D3 SERPL-MCNC: 30.6 NG/ML (ref 30–100)
ALBUMIN SERPL-MCNC: 4.9 G/DL (ref 3.5–5.2)
ALBUMIN UR-MCNC: <1.2 MG/DL
ALBUMIN/GLOB SERPL: 1.5 G/DL
ALP SERPL-CCNC: 170 U/L (ref 39–117)
ALT SERPL W P-5'-P-CCNC: 35 U/L (ref 1–41)
ANION GAP SERPL CALCULATED.3IONS-SCNC: 13.1 MMOL/L (ref 5–15)
AST SERPL-CCNC: 24 U/L (ref 1–40)
BASOPHILS # BLD AUTO: 0.07 10*3/MM3 (ref 0–0.2)
BASOPHILS NFR BLD AUTO: 0.9 % (ref 0–1.5)
BILIRUB SERPL-MCNC: 0.4 MG/DL (ref 0–1.2)
BUN SERPL-MCNC: 11 MG/DL (ref 6–20)
BUN/CREAT SERPL: 10 (ref 7–25)
CALCIUM SPEC-SCNC: 10.4 MG/DL (ref 8.6–10.5)
CHLORIDE SERPL-SCNC: 103 MMOL/L (ref 98–107)
CHOLEST SERPL-MCNC: 233 MG/DL (ref 0–200)
CO2 SERPL-SCNC: 24.9 MMOL/L (ref 22–29)
CREAT SERPL-MCNC: 1.1 MG/DL (ref 0.76–1.27)
CREAT UR-MCNC: 111.2 MG/DL
DEPRECATED RDW RBC AUTO: 47.2 FL (ref 37–54)
EOSINOPHIL # BLD AUTO: 0.35 10*3/MM3 (ref 0–0.4)
EOSINOPHIL NFR BLD AUTO: 4.7 % (ref 0.3–6.2)
ERYTHROCYTE [DISTWIDTH] IN BLOOD BY AUTOMATED COUNT: 15.8 % (ref 12.3–15.4)
GFR SERPL CREATININE-BSD FRML MDRD: 70 ML/MIN/1.73
GLOBULIN UR ELPH-MCNC: 3.2 GM/DL
GLUCOSE SERPL-MCNC: 119 MG/DL (ref 65–99)
HBA1C MFR BLD: 6.7 % (ref 3.5–5.6)
HCT VFR BLD AUTO: 51.2 % (ref 37.5–51)
HDLC SERPL-MCNC: 36 MG/DL (ref 40–60)
HGB BLD-MCNC: 16.1 G/DL (ref 13–17.7)
IMM GRANULOCYTES # BLD AUTO: 0.01 10*3/MM3 (ref 0–0.05)
IMM GRANULOCYTES NFR BLD AUTO: 0.1 % (ref 0–0.5)
LDLC SERPL CALC-MCNC: 158 MG/DL (ref 0–100)
LDLC/HDLC SERPL: 4.31 {RATIO}
LYMPHOCYTES # BLD AUTO: 2.11 10*3/MM3 (ref 0.7–3.1)
LYMPHOCYTES NFR BLD AUTO: 28.4 % (ref 19.6–45.3)
MAGNESIUM SERPL-MCNC: 2.2 MG/DL (ref 1.6–2.6)
MCH RBC QN AUTO: 27.1 PG (ref 26.6–33)
MCHC RBC AUTO-ENTMCNC: 31.4 G/DL (ref 31.5–35.7)
MCV RBC AUTO: 86.2 FL (ref 79–97)
MICROALBUMIN/CREAT UR: NORMAL MG/G{CREAT}
MONOCYTES # BLD AUTO: 0.74 10*3/MM3 (ref 0.1–0.9)
MONOCYTES NFR BLD AUTO: 10 % (ref 5–12)
NEUTROPHILS NFR BLD AUTO: 4.14 10*3/MM3 (ref 1.7–7)
NEUTROPHILS NFR BLD AUTO: 55.9 % (ref 42.7–76)
NRBC BLD AUTO-RTO: 0 /100 WBC (ref 0–0.2)
PLATELET # BLD AUTO: 313 10*3/MM3 (ref 140–450)
PMV BLD AUTO: 11.2 FL (ref 6–12)
POTASSIUM SERPL-SCNC: 4.4 MMOL/L (ref 3.5–5.2)
PROT SERPL-MCNC: 8.1 G/DL (ref 6–8.5)
RBC # BLD AUTO: 5.94 10*6/MM3 (ref 4.14–5.8)
SODIUM SERPL-SCNC: 141 MMOL/L (ref 136–145)
TRIGL SERPL-MCNC: 209 MG/DL (ref 0–150)
TSH SERPL DL<=0.05 MIU/L-ACNC: 6.23 UIU/ML (ref 0.27–4.2)
URATE SERPL-MCNC: 6.4 MG/DL (ref 3.4–7)
VIT B12 BLD-MCNC: 848 PG/ML (ref 211–946)
VLDLC SERPL-MCNC: 39 MG/DL (ref 5–40)
WBC # BLD AUTO: 7.42 10*3/MM3 (ref 3.4–10.8)

## 2021-10-08 PROCEDURE — 83036 HEMOGLOBIN GLYCOSYLATED A1C: CPT | Performed by: PREVENTIVE MEDICINE

## 2021-10-08 PROCEDURE — 82607 VITAMIN B-12: CPT | Performed by: PREVENTIVE MEDICINE

## 2021-10-08 PROCEDURE — 85025 COMPLETE CBC W/AUTO DIFF WBC: CPT | Performed by: PREVENTIVE MEDICINE

## 2021-10-08 PROCEDURE — 99214 OFFICE O/P EST MOD 30 MIN: CPT | Performed by: PREVENTIVE MEDICINE

## 2021-10-08 PROCEDURE — 84443 ASSAY THYROID STIM HORMONE: CPT | Performed by: PREVENTIVE MEDICINE

## 2021-10-08 PROCEDURE — 80053 COMPREHEN METABOLIC PANEL: CPT | Performed by: PREVENTIVE MEDICINE

## 2021-10-08 PROCEDURE — 82043 UR ALBUMIN QUANTITATIVE: CPT | Performed by: PREVENTIVE MEDICINE

## 2021-10-08 PROCEDURE — 82570 ASSAY OF URINE CREATININE: CPT | Performed by: PREVENTIVE MEDICINE

## 2021-10-08 PROCEDURE — 80061 LIPID PANEL: CPT | Performed by: PREVENTIVE MEDICINE

## 2021-10-08 PROCEDURE — 83735 ASSAY OF MAGNESIUM: CPT | Performed by: PREVENTIVE MEDICINE

## 2021-10-08 PROCEDURE — 84550 ASSAY OF BLOOD/URIC ACID: CPT | Performed by: PREVENTIVE MEDICINE

## 2021-10-08 PROCEDURE — 82306 VITAMIN D 25 HYDROXY: CPT | Performed by: PREVENTIVE MEDICINE

## 2021-10-08 RX ORDER — DIPHENHYDRAMINE HCL 50 MG/1
CAPSULE, LIQUID FILLED ORAL
COMMUNITY

## 2021-10-08 RX ORDER — DIPHENOXYLATE HYDROCHLORIDE AND ATROPINE SULFATE 2.5; .025 MG/1; MG/1
TABLET ORAL
COMMUNITY
Start: 2021-05-01

## 2021-10-08 NOTE — PROGRESS NOTES
Venipuncture Blood Specimen Collection  Venipuncture performed in left arm by Morenita Cervantes MA with good hemostasis. Patient tolerated the procedure well without complications.   10/08/21   Morenita Cervantes MA

## 2021-10-08 NOTE — PROGRESS NOTES
Brenda Zelaya is a 50 y.o. male presents for   Chief Complaint   Patient presents with   • Diabetes   • Hypertension   • Hyperlipidemia   Patient presents today for follow-up of multiple chronic health conditions most of which are stable.  He does admit to no substance use for the last year however wife did call several months ago and states that he was back using again methamphetamine is his main drug of choice.  Patient blood pressure is not under good control today he will home monitor and send us the results we have advised that he restart vitamin D levels are pending.    Health Maintenance Due   Topic Date Due   • Pneumococcal Vaccine 0-64 (1 of 2 - PPSV23) Never done   • COVID-19 Vaccine (1) Never done   • Hepatitis B (1 of 3 - Risk 3-dose series) Never done   • TDAP/TD VACCINES (1 - Tdap) Never done   • ZOSTER VACCINE (1 of 2) Never done   • DIABETIC FOOT EXAM  Never done   • DIABETIC EYE EXAM  Never done   • HEMOGLOBIN A1C  07/26/2021   • INFLUENZA VACCINE  Never done       History of Present Illness     Vitals:    10/08/21 0758 10/08/21 0759   BP: 158/92 126/88   BP Location: Right arm Left arm   Patient Position: Sitting Sitting   Cuff Size: Adult Adult   Pulse: 76    Temp: 98.2 °F (36.8 °C)    SpO2: 96%    Weight: 115 kg (253 lb 6.4 oz)      Body mass index is 34.37 kg/m².    Current Outpatient Medications on File Prior to Visit   Medication Sig Dispense Refill   • allopurinol (ZYLOPRIM) 100 MG tablet      • amLODIPine (NORVASC) 10 MG tablet TAKE 1 TABLET BY MOUTH DAILY 90 tablet 1   • aspirin 81 MG EC tablet Take 81 mg by mouth Daily.     • cloNIDine (CATAPRES) 0.1 MG tablet      • Farxiga 10 MG tablet Take 10 mg by mouth Daily. 90 tablet 2   • multivitamin (Multi-Vitamin Daily) tablet tablet      • omeprazole (priLOSEC) 40 MG capsule TAKE 1 CAPSULE BY MOUTH DAILY 90 capsule 1   • diphenhydrAMINE HCl, Sleep, (Sleep Aid) 50 MG capsule      • [DISCONTINUED] benzonatate (Tessalon Perles) 100 MG  capsule Take 1 capsule by mouth 3 (Three) Times a Day As Needed for Cough. 30 capsule 0   • [DISCONTINUED] sildenafil (VIAGRA) 25 MG tablet TAKE 2 TO 4 TABLETS BY MOUTH AS DIRECTED       No current facility-administered medications on file prior to visit.       The following portions of the patient's history were reviewed and updated as appropriate: allergies, current medications, past family history, past medical history, past social history, past surgical history and problem list.    Review of Systems   Gastrointestinal: Positive for GERD.       Objective   Physical Exam  Vitals reviewed.   Constitutional:       General: He is not in acute distress.     Appearance: He is well-developed. He is obese. He is not ill-appearing or toxic-appearing.   HENT:      Head: Normocephalic and atraumatic.      Right Ear: Tympanic membrane, ear canal and external ear normal.      Left Ear: Tympanic membrane, ear canal and external ear normal.      Nose: Nose normal.   Eyes:      Extraocular Movements: Extraocular movements intact.      Conjunctiva/sclera: Conjunctivae normal.      Pupils: Pupils are equal, round, and reactive to light.   Cardiovascular:      Rate and Rhythm: Normal rate and regular rhythm.      Pulses:           Dorsalis pedis pulses are 1+ on the right side and 1+ on the left side.        Posterior tibial pulses are 1+ on the right side and 1+ on the left side.      Heart sounds: Normal heart sounds.   Pulmonary:      Effort: Pulmonary effort is normal.      Breath sounds: Wheezing present.      Comments: Decreased breath sounds bilaterally  Abdominal:      General: Bowel sounds are normal. There is no distension.      Palpations: Abdomen is soft. There is no mass.      Tenderness: There is no abdominal tenderness.   Musculoskeletal:         General: Normal range of motion.      Cervical back: Neck supple.   Feet:      Right foot:      Protective Sensation: 10 sites tested. 10 sites sensed.      Skin integrity:  Skin integrity normal.      Left foot:      Protective Sensation: 10 sites tested. 10 sites sensed.      Skin integrity: Skin integrity normal.      Toenail Condition: Left toenails are long.      Comments: Diabetic Foot Exam Performed and Monofilament Test Performed    Skin:     General: Skin is warm.   Neurological:      General: No focal deficit present.      Mental Status: He is alert and oriented to person, place, and time.   Psychiatric:         Mood and Affect: Mood normal.         Behavior: Behavior normal.       PHQ-9 Total Score:      Assessment/Plan   Diagnoses and all orders for this visit:    1. DM type 2, goal A1C to be determined (HCC) (Primary)  Comments:  No low sugars or extremely high sugars patient will get eye exam  Orders:  -     Comprehensive Metabolic Panel  -     Hemoglobin A1c  -     Microalbumin / Creatinine Urine Ratio - Urine, Clean Catch  -     TSH  -     Vitamin B12    2. Gastroesophageal reflux disease, unspecified whether esophagitis present  Comments:  Not well controlled patient will check with gastroenterologist  Orders:  -     CBC Auto Differential  -     Magnesium    3. Substance abuse (HCC)  Comments:  Patient states no substance abuse in over a year however wife states by phone that that is not the case.    4. Chronic gout of foot, unspecified cause, unspecified laterality  Comments:  No kidney stones or arthritis.  Orders:  -     Uric Acid    5. Secondary hypertension  Comments:  Blood pressure not controlled in the office patient will home monitor and send the results.    6. Vitamin D deficiency  Comments:  Not taking vitamin D regularly  Orders:  -     Vitamin D 25 Hydroxy    7. Other hyperlipidemia  Comments:  Trying to watch sat fats  Orders:  -     Lipid Panel    8. Class 1 obesity due to excess calories with serious comorbidity and body mass index (BMI) of 34.0 to 34.9 in adult        Patient Instructions     Health Maintenance Due   Topic Date Due   • Pneumococcal  Vaccine 0-64 (1 of 2 - PPSV23) Never done   • COVID-19 Vaccine (1) Never done   • Hepatitis B (1 of 3 - Risk 3-dose series) Never done   • TDAP/TD VACCINES (1 - Tdap) Never done   • ZOSTER VACCINE (1 of 2) Never done   • DIABETIC FOOT EXAM  Never done   • DIABETIC EYE EXAM  Never done   • HEMOGLOBIN A1C  07/26/2021   • INFLUENZA VACCINE  Never done     Check blood pressure cuff for accuracy and send 10 blood pressures over 2 weeks.  Watch sodium, alcohol and weight    Patient will check with wife about vaccinations she can give or weshould give           Answers for HPI/ROS submitted by the patient on 10/8/2021  What is the primary reason for your visit?: Other  Please describe your symptoms.: Med management.  Erectile dysfunction  Have you had these symptoms before?: Yes  How long have you been having these symptoms?: Greater than 2 weeks  Please list any medications you are currently taking for this condition.: Generic viagra gives severe headaches  Please describe any probable cause for these symptoms. : Years of substance abuse? Clean for almost a year

## 2021-10-08 NOTE — PATIENT INSTRUCTIONS
Health Maintenance Due   Topic Date Due   • Pneumococcal Vaccine 0-64 (1 of 2 - PPSV23) Never done   • COVID-19 Vaccine (1) Never done   • Hepatitis B (1 of 3 - Risk 3-dose series) Never done   • TDAP/TD VACCINES (1 - Tdap) Never done   • ZOSTER VACCINE (1 of 2) Never done   • DIABETIC FOOT EXAM  Never done   • DIABETIC EYE EXAM  Never done   • HEMOGLOBIN A1C  07/26/2021   • INFLUENZA VACCINE  Never done     Check blood pressure cuff for accuracy and send 10 blood pressures over 2 weeks.  Watch sodium, alcohol and weight    Patient will check with wife about vaccinations she can give or weshould give

## 2021-10-09 NOTE — PROGRESS NOTES
Glucose 119 with A1C showing good control at 6.7.  Thyroid is low-can repeat 1 month or can start small dose and then recheck in one month-let me know.  Alk phos is blood, bone, liver chemical and it continues to elevate-would advise recheck in one month as well and if continues to elevate will work that up as well.  Total and bad cholesterol are elevated and he should consider statin due to DM and cholesterol elevation-let me know.  Continue to watch sat fats and walk.

## 2021-10-10 ENCOUNTER — TELEPHONE (OUTPATIENT)
Dept: FAMILY MEDICINE CLINIC | Facility: CLINIC | Age: 51
End: 2021-10-10

## 2021-10-10 NOTE — TELEPHONE ENCOUNTER
Hub to read  ----- Message from Ruth Owen MD sent at 10/9/2021 10:12 AM EDT -----  Glucose 119 with A1C showing good control at 6.7.  Thyroid is low-can repeat 1 month or can start small dose and then recheck in one month-let me know.  Alk phos is blood, bone, liver chemical and it continues to elevate-would advise recheck in one month as well and if continues to elevate will work that up as well.  Total and bad cholesterol are elevated and he should consider statin due to DM and cholesterol elevation-let me know.  Continue to watch sat fats and walk.

## 2021-10-12 ENCOUNTER — HOSPITAL ENCOUNTER (OUTPATIENT)
Dept: CARDIOLOGY | Facility: HOSPITAL | Age: 51
Discharge: HOME | End: 2021-10-12

## 2021-10-12 ENCOUNTER — HOSPITAL ENCOUNTER (OUTPATIENT)
Dept: CT IMAGING | Facility: HOSPITAL | Age: 51
Discharge: HOME | End: 2021-10-12

## 2021-10-12 ENCOUNTER — TELEPHONE (OUTPATIENT)
Dept: FAMILY MEDICINE CLINIC | Facility: CLINIC | Age: 51
End: 2021-10-12

## 2021-10-12 DIAGNOSIS — Z13.6 ENCOUNTER FOR SCREENING FOR VASCULAR DISEASE: ICD-10-CM

## 2021-10-12 LAB
BH CV XLRA MEAS - MID AO DIAM: 1.8 CM
BH CV XLRA MEAS - PAD LEFT ABI PT: 1.26
BH CV XLRA MEAS - PAD LEFT ARM: 134 MMHG
BH CV XLRA MEAS - PAD LEFT LEG PT: 173 MMHG
BH CV XLRA MEAS - PAD RIGHT ABI PT: 1.2
BH CV XLRA MEAS - PAD RIGHT ARM: 137 MMHG
BH CV XLRA MEAS - PAD RIGHT LEG PT: 164 MMHG
BH CV XLRA MEAS LEFT DIST CCA EDV: 24.2 CM/SEC
BH CV XLRA MEAS LEFT DIST CCA PSV: 88.8 CM/SEC
BH CV XLRA MEAS LEFT ICA/CCA RATIO: 0.8
BH CV XLRA MEAS LEFT MID CCA PSV: 89 CM/SEC
BH CV XLRA MEAS LEFT MID ICA PSV: 68 CM/SEC
BH CV XLRA MEAS LEFT PROX ICA EDV: -23.6 CM/SEC
BH CV XLRA MEAS LEFT PROX ICA PSV: -68.3 CM/SEC
BH CV XLRA MEAS RIGHT DIST CCA EDV: 18 CM/SEC
BH CV XLRA MEAS RIGHT DIST CCA PSV: 75.2 CM/SEC
BH CV XLRA MEAS RIGHT ICA/CCA RATIO: 0.7
BH CV XLRA MEAS RIGHT MID CCA PSV: 75 CM/SEC
BH CV XLRA MEAS RIGHT MID ICA PSV: 51 CM/SEC
BH CV XLRA MEAS RIGHT PROX ICA EDV: -14.9 CM/SEC
BH CV XLRA MEAS RIGHT PROX ICA PSV: -50.9 CM/SEC
MAXIMAL PREDICTED HEART RATE: 168 BPM
STRESS TARGET HR: 143 BPM

## 2021-10-12 PROCEDURE — 93799 UNLISTED CV SVC/PROCEDURE: CPT

## 2021-10-12 PROCEDURE — 75571 CT HRT W/O DYE W/CA TEST: CPT

## 2021-10-12 RX ORDER — ATORVASTATIN CALCIUM 20 MG/1
20 TABLET, FILM COATED ORAL DAILY
Qty: 90 TABLET | Refills: 3 | Status: SHIPPED | OUTPATIENT
Start: 2021-10-12 | End: 2023-01-26

## 2021-10-12 RX ORDER — ALLOPURINOL 100 MG/1
100 TABLET ORAL DAILY
Qty: 90 TABLET | Refills: 3 | Status: SHIPPED | OUTPATIENT
Start: 2021-10-12 | End: 2022-10-20

## 2021-10-12 NOTE — TELEPHONE ENCOUNTER
PATIENT MADE AWARE OF MESSAGE. WILL CALL BACK TO GET LABS IN A MONTH. HE ALSO WANTS TO KNOW IF HE IS GETTING A STATIN CALLED IN OR WAITING THE MONTH

## 2021-10-12 NOTE — TELEPHONE ENCOUNTER
HUB TO READ  ----- Message from Ruth Owen MD sent at 10/12/2021  3:31 PM EDT -----  Mild plaquing Right carotid artery

## 2021-10-13 RX ORDER — OMEPRAZOLE 40 MG/1
40 CAPSULE, DELAYED RELEASE ORAL DAILY
Qty: 90 CAPSULE | Refills: 1 | Status: SHIPPED | OUTPATIENT
Start: 2021-10-13 | End: 2022-06-29

## 2021-10-25 RX ORDER — DAPAGLIFLOZIN 10 MG/1
TABLET, FILM COATED ORAL
Qty: 90 TABLET | Refills: 2 | Status: SHIPPED | OUTPATIENT
Start: 2021-10-25 | End: 2022-02-01 | Stop reason: SDUPTHER

## 2021-10-25 RX ORDER — AMLODIPINE BESYLATE 10 MG/1
10 TABLET ORAL DAILY
Qty: 90 TABLET | Refills: 1 | Status: SHIPPED | OUTPATIENT
Start: 2021-10-25 | End: 2022-07-08

## 2021-11-09 ENCOUNTER — TELEPHONE (OUTPATIENT)
Dept: FAMILY MEDICINE CLINIC | Facility: CLINIC | Age: 51
End: 2021-11-09

## 2021-11-09 DIAGNOSIS — I15.9 SECONDARY HYPERTENSION: Primary | ICD-10-CM

## 2021-11-09 DIAGNOSIS — E78.49 OTHER HYPERLIPIDEMIA: ICD-10-CM

## 2021-11-09 DIAGNOSIS — E11.9 DM TYPE 2, GOAL A1C TO BE DETERMINED (HCC): ICD-10-CM

## 2021-11-09 DIAGNOSIS — K21.9 GASTROESOPHAGEAL REFLUX DISEASE, UNSPECIFIED WHETHER ESOPHAGITIS PRESENT: ICD-10-CM

## 2021-11-09 DIAGNOSIS — M1A.0790 CHRONIC GOUT OF FOOT, UNSPECIFIED CAUSE, UNSPECIFIED LATERALITY: ICD-10-CM

## 2021-11-09 NOTE — TELEPHONE ENCOUNTER
Patient came in the office today and is asking about the cardiac screen completed on 10/12/21- specifically the large hernia. He is wanting to know what happens now? He is also needing lab orders - you were wanting some re-checked in 4 weeks.

## 2021-11-09 NOTE — TELEPHONE ENCOUNTER
Lab prders placed-please put in for site he wants to have them done and advise if having problems with hiatal hernia-can refer to surgeon to talk about fixing-let me know

## 2021-11-10 DIAGNOSIS — K44.9 HIATAL HERNIA: Primary | ICD-10-CM

## 2021-11-10 NOTE — TELEPHONE ENCOUNTER
Spoke with patient.  Will go to [facility] for labs.  He does want to proceed with general or gastro for the hiatal hernia

## 2021-11-23 ENCOUNTER — LAB (OUTPATIENT)
Dept: LAB | Facility: HOSPITAL | Age: 51
End: 2021-11-23

## 2021-11-23 DIAGNOSIS — E11.9 DM TYPE 2, GOAL A1C TO BE DETERMINED (HCC): ICD-10-CM

## 2021-11-23 DIAGNOSIS — K21.9 GASTROESOPHAGEAL REFLUX DISEASE, UNSPECIFIED WHETHER ESOPHAGITIS PRESENT: ICD-10-CM

## 2021-11-23 DIAGNOSIS — M1A.0790 CHRONIC GOUT OF FOOT, UNSPECIFIED CAUSE, UNSPECIFIED LATERALITY: ICD-10-CM

## 2021-11-23 DIAGNOSIS — E78.49 OTHER HYPERLIPIDEMIA: ICD-10-CM

## 2021-11-23 LAB
ALBUMIN SERPL-MCNC: 4.8 G/DL (ref 3.5–5.2)
ALBUMIN UR-MCNC: <1.2 MG/DL
ALBUMIN/GLOB SERPL: 1.5 G/DL
ALP SERPL-CCNC: 182 U/L (ref 39–117)
ALT SERPL W P-5'-P-CCNC: 41 U/L (ref 1–41)
ANION GAP SERPL CALCULATED.3IONS-SCNC: 12.6 MMOL/L (ref 5–15)
AST SERPL-CCNC: 30 U/L (ref 1–40)
BASOPHILS # BLD AUTO: 0.07 10*3/MM3 (ref 0–0.2)
BASOPHILS NFR BLD AUTO: 0.9 % (ref 0–1.5)
BILIRUB SERPL-MCNC: 0.4 MG/DL (ref 0–1.2)
BUN SERPL-MCNC: 11 MG/DL (ref 6–20)
BUN/CREAT SERPL: 10 (ref 7–25)
CALCIUM SPEC-SCNC: 9.6 MG/DL (ref 8.6–10.5)
CHLORIDE SERPL-SCNC: 104 MMOL/L (ref 98–107)
CHOLEST SERPL-MCNC: 160 MG/DL (ref 0–200)
CO2 SERPL-SCNC: 24.4 MMOL/L (ref 22–29)
CREAT SERPL-MCNC: 1.1 MG/DL (ref 0.76–1.27)
CREAT UR-MCNC: 123.9 MG/DL
DEPRECATED RDW RBC AUTO: 47.6 FL (ref 37–54)
EOSINOPHIL # BLD AUTO: 0.21 10*3/MM3 (ref 0–0.4)
EOSINOPHIL NFR BLD AUTO: 2.6 % (ref 0.3–6.2)
ERYTHROCYTE [DISTWIDTH] IN BLOOD BY AUTOMATED COUNT: 15.3 % (ref 12.3–15.4)
GFR SERPL CREATININE-BSD FRML MDRD: 70 ML/MIN/1.73
GLOBULIN UR ELPH-MCNC: 3.1 GM/DL
GLUCOSE SERPL-MCNC: 118 MG/DL (ref 65–99)
HBA1C MFR BLD: 6.8 % (ref 3.5–5.6)
HCT VFR BLD AUTO: 48.7 % (ref 37.5–51)
HDLC SERPL-MCNC: 43 MG/DL (ref 40–60)
HGB BLD-MCNC: 15.8 G/DL (ref 13–17.7)
IMM GRANULOCYTES # BLD AUTO: 0.03 10*3/MM3 (ref 0–0.05)
IMM GRANULOCYTES NFR BLD AUTO: 0.4 % (ref 0–0.5)
LDLC SERPL CALC-MCNC: 92 MG/DL (ref 0–100)
LDLC/HDLC SERPL: 2.06 {RATIO}
LYMPHOCYTES # BLD AUTO: 2.22 10*3/MM3 (ref 0.7–3.1)
LYMPHOCYTES NFR BLD AUTO: 27.4 % (ref 19.6–45.3)
MAGNESIUM SERPL-MCNC: 2.3 MG/DL (ref 1.6–2.6)
MCH RBC QN AUTO: 27.9 PG (ref 26.6–33)
MCHC RBC AUTO-ENTMCNC: 32.4 G/DL (ref 31.5–35.7)
MCV RBC AUTO: 86 FL (ref 79–97)
MICROALBUMIN/CREAT UR: NORMAL MG/G{CREAT}
MONOCYTES # BLD AUTO: 0.71 10*3/MM3 (ref 0.1–0.9)
MONOCYTES NFR BLD AUTO: 8.8 % (ref 5–12)
NEUTROPHILS NFR BLD AUTO: 4.86 10*3/MM3 (ref 1.7–7)
NEUTROPHILS NFR BLD AUTO: 59.9 % (ref 42.7–76)
NRBC BLD AUTO-RTO: 0 /100 WBC (ref 0–0.2)
PLATELET # BLD AUTO: 309 10*3/MM3 (ref 140–450)
PMV BLD AUTO: 10.8 FL (ref 6–12)
POTASSIUM SERPL-SCNC: 4.5 MMOL/L (ref 3.5–5.2)
PROT SERPL-MCNC: 7.9 G/DL (ref 6–8.5)
RBC # BLD AUTO: 5.66 10*6/MM3 (ref 4.14–5.8)
SODIUM SERPL-SCNC: 141 MMOL/L (ref 136–145)
TRIGL SERPL-MCNC: 142 MG/DL (ref 0–150)
TSH SERPL DL<=0.05 MIU/L-ACNC: 4.91 UIU/ML (ref 0.27–4.2)
URATE SERPL-MCNC: 4.6 MG/DL (ref 3.4–7)
VIT B12 BLD-MCNC: 651 PG/ML (ref 211–946)
VLDLC SERPL-MCNC: 25 MG/DL (ref 5–40)
WBC NRBC COR # BLD: 8.1 10*3/MM3 (ref 3.4–10.8)

## 2021-11-23 PROCEDURE — 85025 COMPLETE CBC W/AUTO DIFF WBC: CPT

## 2021-11-23 PROCEDURE — 80061 LIPID PANEL: CPT

## 2021-11-23 PROCEDURE — 82607 VITAMIN B-12: CPT

## 2021-11-23 PROCEDURE — 83036 HEMOGLOBIN GLYCOSYLATED A1C: CPT

## 2021-11-23 PROCEDURE — 84443 ASSAY THYROID STIM HORMONE: CPT

## 2021-11-23 PROCEDURE — 80053 COMPREHEN METABOLIC PANEL: CPT

## 2021-11-23 PROCEDURE — 83735 ASSAY OF MAGNESIUM: CPT

## 2021-11-23 PROCEDURE — 82570 ASSAY OF URINE CREATININE: CPT

## 2021-11-23 PROCEDURE — 84550 ASSAY OF BLOOD/URIC ACID: CPT

## 2021-11-23 PROCEDURE — 82043 UR ALBUMIN QUANTITATIVE: CPT

## 2021-11-24 DIAGNOSIS — R74.8 ELEVATED ALKALINE PHOSPHATASE LEVEL: Primary | ICD-10-CM

## 2021-11-24 NOTE — PROGRESS NOTES
Cholesterol and glucose look super with A1C showing control.  Thyroid is still slightly low-can start low dose or repeat with next labs.  Alkaline phosphatase is continuing to elevated-advise we get additional testing.

## 2021-11-26 ENCOUNTER — LAB (OUTPATIENT)
Dept: LAB | Facility: HOSPITAL | Age: 51
End: 2021-11-26

## 2021-11-26 LAB
HAV IGM SERPL QL IA: NORMAL
HBV CORE IGM SERPL QL IA: NORMAL
HBV SURFACE AG SERPL QL IA: NORMAL
HCV AB SER DONR QL: NORMAL

## 2021-11-26 PROCEDURE — 86200 CCP ANTIBODY: CPT | Performed by: PREVENTIVE MEDICINE

## 2021-11-26 PROCEDURE — 84165 PROTEIN E-PHORESIS SERUM: CPT | Performed by: PREVENTIVE MEDICINE

## 2021-11-26 PROCEDURE — 84155 ASSAY OF PROTEIN SERUM: CPT | Performed by: PREVENTIVE MEDICINE

## 2021-11-26 PROCEDURE — 80074 ACUTE HEPATITIS PANEL: CPT | Performed by: PREVENTIVE MEDICINE

## 2021-11-29 LAB
ALBUMIN SERPL ELPH-MCNC: 3.6 G/DL (ref 2.9–4.4)
ALBUMIN/GLOB SERPL: 1.1 {RATIO} (ref 0.7–1.7)
ALPHA1 GLOB SERPL ELPH-MCNC: 0.2 G/DL (ref 0–0.4)
ALPHA2 GLOB SERPL ELPH-MCNC: 0.8 G/DL (ref 0.4–1)
B-GLOBULIN SERPL ELPH-MCNC: 1.3 G/DL (ref 0.7–1.3)
CCP IGA+IGG SERPL IA-ACNC: 9 UNITS (ref 0–19)
GAMMA GLOB SERPL ELPH-MCNC: 1.1 G/DL (ref 0.4–1.8)
GLOBULIN SER CALC-MCNC: 3.4 G/DL (ref 2.2–3.9)
LABORATORY COMMENT REPORT: NORMAL
M PROTEIN SERPL ELPH-MCNC: NORMAL G/DL
PROT SERPL-MCNC: 7 G/DL (ref 6–8.5)

## 2021-11-30 ENCOUNTER — LAB (OUTPATIENT)
Dept: LAB | Facility: HOSPITAL | Age: 51
End: 2021-11-30

## 2021-11-30 PROCEDURE — 84156 ASSAY OF PROTEIN URINE: CPT | Performed by: PREVENTIVE MEDICINE

## 2021-11-30 PROCEDURE — 84166 PROTEIN E-PHORESIS/URINE/CSF: CPT | Performed by: PREVENTIVE MEDICINE

## 2021-12-02 ENCOUNTER — TELEPHONE (OUTPATIENT)
Dept: FAMILY MEDICINE CLINIC | Facility: CLINIC | Age: 51
End: 2021-12-02

## 2021-12-02 LAB
ALBUMIN 24H MFR UR ELPH: 27.7 %
ALPHA1 GLOB 24H MFR UR ELPH: 5.8 %
ALPHA2 GLOB 24H MFR UR ELPH: 25.2 %
B-GLOBULIN 24H MFR UR ELPH: 26.2 %
GAMMA GLOB 24H MFR UR ELPH: 15.1 %
LABORATORY COMMENT REPORT: NORMAL
M PROTEIN 24H MFR UR ELPH: NORMAL %
PROT 24H UR-MRATE: 117 MG/24 HR (ref 30–150)
PROT UR-MCNC: 4.5 MG/DL

## 2021-12-02 NOTE — TELEPHONE ENCOUNTER
Hub to read  ----- Message from Ruth Owen MD sent at 12/2/2021  2:56 PM EST -----  Labs don't show cause for elevating Alk phos-let's move to CT abdomen and pelvis next-orders have been placed.

## 2021-12-09 ENCOUNTER — HOSPITAL ENCOUNTER (OUTPATIENT)
Dept: CT IMAGING | Facility: HOSPITAL | Age: 51
Discharge: HOME | End: 2021-12-09
Admitting: PREVENTIVE MEDICINE

## 2021-12-09 ENCOUNTER — TELEPHONE (OUTPATIENT)
Dept: FAMILY MEDICINE CLINIC | Facility: CLINIC | Age: 51
End: 2021-12-09

## 2021-12-09 DIAGNOSIS — R74.8 ELEVATED ALKALINE PHOSPHATASE LEVEL: ICD-10-CM

## 2021-12-09 DIAGNOSIS — R74.8 ELEVATED ALKALINE PHOSPHATASE LEVEL: Primary | ICD-10-CM

## 2021-12-09 PROCEDURE — 0 IOPAMIDOL PER 1 ML: Performed by: PREVENTIVE MEDICINE

## 2021-12-09 PROCEDURE — 74177 CT ABD & PELVIS W/CONTRAST: CPT

## 2021-12-09 RX ADMIN — IOPAMIDOL 100 ML: 755 INJECTION, SOLUTION INTRAVENOUS at 10:14

## 2021-12-09 NOTE — TELEPHONE ENCOUNTER
Hub to read  ----- Message from Ruth Owen MD sent at 12/9/2021 11:45 AM EST -----  CT of abdomen shows no clu why abnormal lab-will refer to hematology/oncology

## 2021-12-15 NOTE — PROGRESS NOTES
HEMATOLOGY ONCOLOGY OUTPATIENT CONSULTATION       Patient name: Talon Zelaya  : 1970  MRN: [id number]  Primary Care Physician: Ruth Owen MD  Referring Physician: Ruth Owen MD  Reason For Consult:     No chief complaint on file.    HPI:   History of Present Illness:  Talon Zelaya is 51 y.o. male who presented to our office on 12/15/21 for consultation regarding elevated alkaline phosphatase    2021 Mr. Zelaya was referred for the investigation of a persistent elevation in his alkaline phosphatase.  This was first identified approximately a year before his initial evaluation at the [facility].  Since that time the enzyme had been increasing slowly.  The transaminases were perfectly within normal limits and the bilirubin, as well, was within parameters.  His blood count was essentially normal and he had investigations for for multiple myeloma, which had cast negative results.  For approximately the same length of time he had been taking atorvastatin.  He was completely asymptomatic.    Subjective:  • 12/15/21.  Mr. Zelaya is in the office for initial evaluation.  He reports he has been feeling well.  He has been as active as usual and that he has had is good and appetite as most often.  History of pain.  He has been afebrile and he has been without increasing dyspnea, cough or chest pains.  He has not had glossodynia or pharyngodynea.  He is free of dysphagia or odynophagia.  He does not complain of substernal chest pains and has been free of abdominal pain.  No changes in bowel habits and no dysuria, hematuria, melena or hematochezia.  No peripheral edema.  No skin rash    The following portions of the patient's history were reviewed and updated as appropriate: allergies, current medications, past family history, past medical history, past social history, past surgical history and problem list.    Past Medical History:   Diagnosis Date    • GERD (gastroesophageal reflux disease)      Past Surgical History:   Procedure Laterality Date   • APPENDECTOMY  1978   • COLONOSCOPY N/A 6/30/2021    Procedure: COLONOSCOPY with polypectomy X4;  Surgeon: Evin Winn MD;  Location: [facility] ENDOSCOPY;  Service: Gastroenterology;  Laterality: N/A;  Post: polyps       Current Outpatient Medications:   •  allopurinol (ZYLOPRIM) 100 MG tablet, Take 1 tablet by mouth Daily., Disp: 90 tablet, Rfl: 3  •  amLODIPine (NORVASC) 10 MG tablet, TAKE 1 TABLET BY MOUTH DAILY, Disp: 90 tablet, Rfl: 1  •  aspirin 81 MG EC tablet, Take 81 mg by mouth Daily., Disp: , Rfl:   •  atorvastatin (LIPITOR) 20 MG tablet, Take 1 tablet by mouth Daily., Disp: 90 tablet, Rfl: 3  •  cloNIDine (CATAPRES) 0.1 MG tablet, , Disp: , Rfl:   •  diphenhydrAMINE HCl, Sleep, (Sleep Aid) 50 MG capsule, , Disp: , Rfl:   •  Farxiga 10 MG tablet, TAKE 1 TABLET BY MOUTH DAILY, Disp: 90 tablet, Rfl: 2  •  multivitamin (Multi-Vitamin Daily) tablet tablet, , Disp: , Rfl:   •  omeprazole (priLOSEC) 40 MG capsule, TAKE 1 CAPSULE BY MOUTH DAILY, Disp: 90 capsule, Rfl: 1    No Known Allergies    Family History   Problem Relation Age of Onset   • Skin cancer Father    • Heart disease Father    • Hypertension Father    • Hypertension Sister    • Hypertension Sister      Cancer-related family history includes Skin cancer in his father.    Social History     Tobacco Use   • Smoking status: Never Smoker   • Smokeless tobacco: Never Used   Vaping Use   • Vaping Use: Never used   Substance Use Topics   • Alcohol use: Never   • Drug use: Never     Social History     Social History Narrative   • Not on file      ROS:     Review of Systems   Constitutional: Negative for activity change, appetite change, chills, diaphoresis, fatigue, fever and unexpected weight change.   HENT: Negative for congestion, dental problem, drooling, ear discharge, ear pain, facial swelling, hearing loss, mouth sores, nosebleeds, postnasal  drip, rhinorrhea, sinus pressure, sinus pain, sneezing, sore throat, tinnitus, trouble swallowing and voice change.    Eyes: Negative for photophobia, pain, discharge, redness, itching and visual disturbance.   Respiratory: Negative for apnea, cough, choking, chest tightness, shortness of breath, wheezing and stridor.    Cardiovascular: Negative for chest pain, palpitations and leg swelling.   Gastrointestinal: Negative for abdominal distention, abdominal pain, anal bleeding, blood in stool, constipation, diarrhea, nausea, rectal pain and vomiting.   Endocrine: Negative for cold intolerance, heat intolerance, polydipsia and polyuria.   Genitourinary: Negative for decreased urine volume, difficulty urinating, dysuria, flank pain, frequency, genital sores, hematuria and urgency.   Musculoskeletal: Negative for arthralgias, back pain, gait problem, joint swelling, myalgias, neck pain and neck stiffness.   Skin: Negative for color change, pallor and rash.   Neurological: Negative for dizziness, tremors, seizures, syncope, facial asymmetry, speech difficulty, weakness, light-headedness, numbness and headaches.   Hematological: Negative for adenopathy. Does not bruise/bleed easily.   Psychiatric/Behavioral: Negative for agitation, behavioral problems, confusion, decreased concentration, hallucinations, self-injury, sleep disturbance and suicidal ideas. The patient is not nervous/anxious.      Objective:    There were no vitals filed for this visit.  There is no height or weight on file to calculate BMI.  ECOG  (0) Fully active, able to carry on all predisease performance without restriction    Physical Exam:     Physical Exam  Constitutional:       General: He is not in acute distress.     Appearance: Normal appearance. He is not ill-appearing, toxic-appearing or diaphoretic.   HENT:      Head: Normocephalic and atraumatic.      Right Ear: External ear normal. There is no impacted cerumen.      Left Ear: External ear  normal. There is no impacted cerumen.      Nose: Nose normal.      Mouth/Throat:      Mouth: Mucous membranes are moist.      Pharynx: Oropharynx is clear. No oropharyngeal exudate or posterior oropharyngeal erythema.   Eyes:      General: No scleral icterus.        Right eye: No discharge.         Left eye: No discharge.      Conjunctiva/sclera: Conjunctivae normal.      Pupils: Pupils are equal, round, and reactive to light.   Cardiovascular:      Rate and Rhythm: Normal rate and regular rhythm.      Pulses: Normal pulses.      Heart sounds: Normal heart sounds. No murmur heard.  No friction rub. No gallop.    Pulmonary:      Effort: No respiratory distress.      Breath sounds: No stridor. No wheezing, rhonchi or rales.   Chest:      Chest wall: No tenderness.   Abdominal:      General: Abdomen is flat. Bowel sounds are normal. There is no distension.      Palpations: Abdomen is soft. There is no mass.      Tenderness: There is no abdominal tenderness. There is no right CVA tenderness, left CVA tenderness, guarding or rebound.   Musculoskeletal:         General: No swelling, tenderness, deformity or signs of injury.      Cervical back: No rigidity.      Right lower leg: No edema.      Left lower leg: No edema.   Lymphadenopathy:      Cervical: No cervical adenopathy.   Skin:     General: Skin is warm and dry.      Coloration: Skin is not jaundiced or pale.      Findings: No bruising or rash.   Neurological:      General: No focal deficit present.      Mental Status: He is alert and oriented to person, place, and time.      Cranial Nerves: No cranial nerve deficit.      Coordination: Coordination normal.      Gait: Gait normal.   Psychiatric:         Mood and Affect: Mood normal.         Behavior: Behavior normal.         Thought Content: Thought content normal.         Judgment: Judgment normal.     Yogesh Spain MD performed a physical exam on 12/16/2021 as documented above.    Lab Results - Last 18 Months    Lab Units 11/23/21  1014 10/08/21  0847 01/26/21  1048   WBC 10*3/mm3 8.10 7.42 7.51   HEMOGLOBIN g/dL 15.8 16.1 16.3   HEMATOCRIT % 48.7 51.2* 48.7   PLATELETS 10*3/mm3 309 313 349   MCV fL 86.0 86.2 87.0     Lab Results - Last 18 Months   Lab Units 11/23/21  1014 10/08/21  0847 01/26/21  1048   SODIUM mmol/L 141 141 141   POTASSIUM mmol/L 4.5 4.4 4.6   CHLORIDE mmol/L 104 103 105   CO2 mmol/L 24.4 24.9 26.4   BUN mg/dL 11 11 11   CREATININE mg/dL 1.10 1.10 0.96   CALCIUM mg/dL 9.6 10.4 9.8   BILIRUBIN mg/dL 0.4 0.4 0.3   ALK PHOS U/L 182* 170* 144*   ALT (SGPT) U/L 41 35 20   AST (SGOT) U/L 30 24 18   GLUCOSE mg/dL 118* 119* 104*     Lab Results   Component Value Date    GLUCOSE 118 (H) 11/23/2021    BUN 11 11/23/2021    CREATININE 1.10 11/23/2021    EGFRIFNONA 70 11/23/2021    BCR 10.0 11/23/2021    K 4.5 11/23/2021    CO2 24.4 11/23/2021    CALCIUM 9.6 11/23/2021    PROTENTOTREF 7.0 11/26/2021    ALBUMIN 3.6 11/26/2021    LABIL2 1.1 11/26/2021    AST 30 11/23/2021    ALT 41 11/23/2021     Lab Results   Component Value Date    ASKFXKRC77 651 11/23/2021     No results found for: SPEP, UPEP  Uric Acid   Date Value Ref Range Status   11/23/2021 4.6 3.4 - 7.0 mg/dL Final     Assessment/Plan     Assessment:  1. Abnormal alkaline phosphatase: I have reviewed the chart and all the laboratory exams.  Mr. Zelaya has a persistently elevated alkaline phosphatase that seems to coincide with the timing of the commencement of atorvastatin to treat his lipid disorders.  This is a drug that frequently leads to abnormalities in the chemistry, particularly the met liver enzymes and, occasionally, elevation of the alkaline phosphatase alone.  I believe this is the cause for the abnormality and I don't see anything in Mr. Zelaya's blood that would lead me to believe that that this is a blood disorder.  It is perhaps reasonable to discontinue the atorvastatin for a period of time and to recheck his liver enzymes to ensure that  this idea is correct.  Discussed with him at length.  He is to see me on an as-needed basis, but I do not believe that he has a primary hematologic disorder or a malignant condition based on his clinical picture that would explain the laboratory abnormalities.      Plan:  1. As above        Yogesh Spain MD on December 16, 2021 at 2:48 PM

## 2021-12-16 ENCOUNTER — LAB (OUTPATIENT)
Dept: LAB | Facility: HOSPITAL | Age: 51
End: 2021-12-16

## 2021-12-16 ENCOUNTER — TELEPHONE (OUTPATIENT)
Dept: FAMILY MEDICINE CLINIC | Facility: CLINIC | Age: 51
End: 2021-12-16

## 2021-12-16 ENCOUNTER — CONSULT (OUTPATIENT)
Dept: ONCOLOGY | Facility: CLINIC | Age: 51
End: 2021-12-16

## 2021-12-16 VITALS
DIASTOLIC BLOOD PRESSURE: 95 MMHG | BODY MASS INDEX: 34.08 KG/M2 | TEMPERATURE: 97.7 F | SYSTOLIC BLOOD PRESSURE: 140 MMHG | WEIGHT: 251.6 LBS | RESPIRATION RATE: 20 BRPM | HEIGHT: 72 IN | HEART RATE: 91 BPM

## 2021-12-16 DIAGNOSIS — R74.8 ELEVATED ALKALINE PHOSPHATASE LEVEL: Primary | ICD-10-CM

## 2021-12-16 DIAGNOSIS — R74.8 ELEVATED ALKALINE PHOSPHATASE LEVEL: ICD-10-CM

## 2021-12-16 LAB
BASOPHILS # BLD AUTO: 0.2 10*3/MM3 (ref 0–0.2)
BASOPHILS NFR BLD AUTO: 2.2 % (ref 0–1.5)
DEPRECATED RDW RBC AUTO: 45.9 FL (ref 37–54)
EOSINOPHIL # BLD AUTO: 0.29 10*3/MM3 (ref 0–0.4)
EOSINOPHIL NFR BLD AUTO: 3.2 % (ref 0.3–6.2)
ERYTHROCYTE [DISTWIDTH] IN BLOOD BY AUTOMATED COUNT: 15.4 % (ref 12.3–15.4)
HCT VFR BLD AUTO: 49.6 % (ref 37.5–51)
HGB BLD-MCNC: 16.9 G/DL (ref 13–17.7)
LYMPHOCYTES # BLD AUTO: 2.14 10*3/MM3 (ref 0.7–3.1)
LYMPHOCYTES NFR BLD AUTO: 23.7 % (ref 19.6–45.3)
MCH RBC QN AUTO: 28.1 PG (ref 26.6–33)
MCHC RBC AUTO-ENTMCNC: 34.1 G/DL (ref 31.5–35.7)
MCV RBC AUTO: 82.4 FL (ref 79–97)
MONOCYTES # BLD AUTO: 0.93 10*3/MM3 (ref 0.1–0.9)
MONOCYTES NFR BLD AUTO: 10.3 % (ref 5–12)
NEUTROPHILS NFR BLD AUTO: 5.46 10*3/MM3 (ref 1.7–7)
NEUTROPHILS NFR BLD AUTO: 60.6 % (ref 42.7–76)
PLATELET # BLD AUTO: 304 10*3/MM3 (ref 140–450)
PMV BLD AUTO: 10.7 FL (ref 6–12)
RBC # BLD AUTO: 6.02 10*6/MM3 (ref 4.14–5.8)
WBC NRBC COR # BLD: 9.02 10*3/MM3 (ref 3.4–10.8)

## 2021-12-16 PROCEDURE — 85025 COMPLETE CBC W/AUTO DIFF WBC: CPT

## 2021-12-16 PROCEDURE — 99204 OFFICE O/P NEW MOD 45 MIN: CPT | Performed by: INTERNAL MEDICINE

## 2021-12-16 NOTE — TELEPHONE ENCOUNTER
sPOKE WITH HEMATOLOGIST AND HE DOESN'T FEEL THAT there is reason for him to pursue some sort of occult cancer at this time.  Lets repeat labs in February and if continues to increase will see what liver doctors have to say.  Keep me posted if anything changes.

## 2021-12-17 ENCOUNTER — PATIENT ROUNDING (BHMG ONLY) (OUTPATIENT)
Dept: ONCOLOGY | Facility: CLINIC | Age: 51
End: 2021-12-17

## 2021-12-17 NOTE — PROGRESS NOTES
December 17, 2021    Hello, may I speak with Talon Zelaya?    My name is Marilu      I am  with [facility]  [address] [place] [address] [phone].    Before we get started may I verify your date of birth? 1970    I am calling to officially welcome you to our practice and ask about your recent visit. Is this a good time to talk? no    Tell me about your visit with us. What things went well?  Had to leave a voice mail and send a My Chart message       We're always looking for ways to make our patients' experiences even better. Do you have recommendations on ways we may improve?  no    Overall were you satisfied with your first visit to our practice? yes       I appreciate you taking the time to speak with me today. Is there anything else I can do for you? no      Thank you, and have a great day.

## 2022-02-01 ENCOUNTER — OFFICE VISIT (OUTPATIENT)
Dept: FAMILY MEDICINE CLINIC | Facility: CLINIC | Age: 52
End: 2022-02-01

## 2022-02-01 VITALS
HEART RATE: 77 BPM | DIASTOLIC BLOOD PRESSURE: 88 MMHG | BODY MASS INDEX: 34.13 KG/M2 | TEMPERATURE: 98.2 F | OXYGEN SATURATION: 96 % | HEIGHT: 72 IN | WEIGHT: 252 LBS | SYSTOLIC BLOOD PRESSURE: 120 MMHG

## 2022-02-01 DIAGNOSIS — E11.9 DM TYPE 2, GOAL A1C TO BE DETERMINED: Primary | ICD-10-CM

## 2022-02-01 DIAGNOSIS — N52.8 OTHER MALE ERECTILE DYSFUNCTION: ICD-10-CM

## 2022-02-01 DIAGNOSIS — I15.9 SECONDARY HYPERTENSION: ICD-10-CM

## 2022-02-01 DIAGNOSIS — E78.49 OTHER HYPERLIPIDEMIA: ICD-10-CM

## 2022-02-01 DIAGNOSIS — M1A.0790 CHRONIC GOUT OF FOOT, UNSPECIFIED CAUSE, UNSPECIFIED LATERALITY: ICD-10-CM

## 2022-02-01 DIAGNOSIS — F19.10 SUBSTANCE ABUSE: ICD-10-CM

## 2022-02-01 DIAGNOSIS — E66.09 CLASS 1 OBESITY DUE TO EXCESS CALORIES WITH SERIOUS COMORBIDITY AND BODY MASS INDEX (BMI) OF 34.0 TO 34.9 IN ADULT: ICD-10-CM

## 2022-02-01 DIAGNOSIS — K21.9 GASTROESOPHAGEAL REFLUX DISEASE, UNSPECIFIED WHETHER ESOPHAGITIS PRESENT: ICD-10-CM

## 2022-02-01 LAB
ALBUMIN SERPL-MCNC: 4.6 G/DL (ref 3.5–5.2)
ALBUMIN/GLOB SERPL: 1.6 G/DL
ALP SERPL-CCNC: 171 U/L (ref 39–117)
ALT SERPL W P-5'-P-CCNC: 33 U/L (ref 1–41)
ANION GAP SERPL CALCULATED.3IONS-SCNC: 10.8 MMOL/L (ref 5–15)
AST SERPL-CCNC: 23 U/L (ref 1–40)
BASOPHILS # BLD AUTO: 0.06 10*3/MM3 (ref 0–0.2)
BASOPHILS NFR BLD AUTO: 0.9 % (ref 0–1.5)
BILIRUB SERPL-MCNC: 0.3 MG/DL (ref 0–1.2)
BUN SERPL-MCNC: 14 MG/DL (ref 6–20)
BUN/CREAT SERPL: 13.9 (ref 7–25)
CALCIUM SPEC-SCNC: 9.6 MG/DL (ref 8.6–10.5)
CHLORIDE SERPL-SCNC: 104 MMOL/L (ref 98–107)
CHOLEST SERPL-MCNC: 254 MG/DL (ref 0–200)
CO2 SERPL-SCNC: 25.2 MMOL/L (ref 22–29)
CREAT SERPL-MCNC: 1.01 MG/DL (ref 0.76–1.27)
DEPRECATED RDW RBC AUTO: 45.5 FL (ref 37–54)
EOSINOPHIL # BLD AUTO: 0.24 10*3/MM3 (ref 0–0.4)
EOSINOPHIL NFR BLD AUTO: 3.5 % (ref 0.3–6.2)
ERYTHROCYTE [DISTWIDTH] IN BLOOD BY AUTOMATED COUNT: 14.5 % (ref 12.3–15.4)
GFR SERPL CREATININE-BSD FRML MDRD: 77 ML/MIN/1.73
GLOBULIN UR ELPH-MCNC: 2.8 GM/DL
GLUCOSE SERPL-MCNC: 135 MG/DL (ref 65–99)
HBA1C MFR BLD: 6.4 % (ref 3.5–5.6)
HCT VFR BLD AUTO: 49.8 % (ref 37.5–51)
HDLC SERPL-MCNC: 45 MG/DL (ref 40–60)
HGB BLD-MCNC: 16 G/DL (ref 13–17.7)
IMM GRANULOCYTES # BLD AUTO: 0.03 10*3/MM3 (ref 0–0.05)
IMM GRANULOCYTES NFR BLD AUTO: 0.4 % (ref 0–0.5)
LDLC SERPL CALC-MCNC: 151 MG/DL (ref 0–100)
LDLC/HDLC SERPL: 3.24 {RATIO}
LYMPHOCYTES # BLD AUTO: 1.87 10*3/MM3 (ref 0.7–3.1)
LYMPHOCYTES NFR BLD AUTO: 27.6 % (ref 19.6–45.3)
MAGNESIUM SERPL-MCNC: 2.2 MG/DL (ref 1.6–2.6)
MCH RBC QN AUTO: 27.8 PG (ref 26.6–33)
MCHC RBC AUTO-ENTMCNC: 32.1 G/DL (ref 31.5–35.7)
MCV RBC AUTO: 86.5 FL (ref 79–97)
MONOCYTES # BLD AUTO: 0.73 10*3/MM3 (ref 0.1–0.9)
MONOCYTES NFR BLD AUTO: 10.8 % (ref 5–12)
NEUTROPHILS NFR BLD AUTO: 3.84 10*3/MM3 (ref 1.7–7)
NEUTROPHILS NFR BLD AUTO: 56.8 % (ref 42.7–76)
NRBC BLD AUTO-RTO: 0 /100 WBC (ref 0–0.2)
PLATELET # BLD AUTO: 267 10*3/MM3 (ref 140–450)
PMV BLD AUTO: 11 FL (ref 6–12)
POTASSIUM SERPL-SCNC: 4.5 MMOL/L (ref 3.5–5.2)
PROT SERPL-MCNC: 7.4 G/DL (ref 6–8.5)
RBC # BLD AUTO: 5.76 10*6/MM3 (ref 4.14–5.8)
SODIUM SERPL-SCNC: 140 MMOL/L (ref 136–145)
TRIGL SERPL-MCNC: 315 MG/DL (ref 0–150)
TSH SERPL DL<=0.05 MIU/L-ACNC: 3.7 UIU/ML (ref 0.27–4.2)
URATE SERPL-MCNC: 4.5 MG/DL (ref 3.4–7)
VIT B12 BLD-MCNC: 573 PG/ML (ref 211–946)
VLDLC SERPL-MCNC: 58 MG/DL (ref 5–40)
WBC NRBC COR # BLD: 6.77 10*3/MM3 (ref 3.4–10.8)

## 2022-02-01 PROCEDURE — 82607 VITAMIN B-12: CPT | Performed by: PREVENTIVE MEDICINE

## 2022-02-01 PROCEDURE — 83036 HEMOGLOBIN GLYCOSYLATED A1C: CPT | Performed by: PREVENTIVE MEDICINE

## 2022-02-01 PROCEDURE — 99214 OFFICE O/P EST MOD 30 MIN: CPT | Performed by: PREVENTIVE MEDICINE

## 2022-02-01 PROCEDURE — 84550 ASSAY OF BLOOD/URIC ACID: CPT | Performed by: PREVENTIVE MEDICINE

## 2022-02-01 PROCEDURE — 80061 LIPID PANEL: CPT | Performed by: PREVENTIVE MEDICINE

## 2022-02-01 PROCEDURE — 82570 ASSAY OF URINE CREATININE: CPT | Performed by: PREVENTIVE MEDICINE

## 2022-02-01 PROCEDURE — 82043 UR ALBUMIN QUANTITATIVE: CPT | Performed by: PREVENTIVE MEDICINE

## 2022-02-01 PROCEDURE — 80050 GENERAL HEALTH PANEL: CPT | Performed by: PREVENTIVE MEDICINE

## 2022-02-01 PROCEDURE — 83735 ASSAY OF MAGNESIUM: CPT | Performed by: PREVENTIVE MEDICINE

## 2022-02-01 RX ORDER — TADALAFIL 10 MG/1
10 TABLET ORAL DAILY PRN
Qty: 12 TABLET | Refills: 1 | Status: SHIPPED | OUTPATIENT
Start: 2022-02-01 | End: 2023-01-26

## 2022-02-01 RX ORDER — DAPAGLIFLOZIN 10 MG/1
1 TABLET, FILM COATED ORAL DAILY
Qty: 90 TABLET | Refills: 2 | Status: SHIPPED | OUTPATIENT
Start: 2022-02-01 | End: 2022-08-19

## 2022-02-01 NOTE — PROGRESS NOTES
"Brenda Zelaya is a 51 y.o. male presents for   Chief Complaint   Patient presents with   • Diabetes     patient does not want to do physical today; wants to discuss statin       Health Maintenance Due   Topic Date Due   • Pneumococcal Vaccine 0-64 (1 of 2 - PPSV23) Never done   • Hepatitis B (1 of 3 - Risk 3-dose series) Never done   • TDAP/TD VACCINES (1 - Tdap) Never done   • ZOSTER VACCINE (1 of 2) Never done   • DIABETIC EYE EXAM  Never done   • INFLUENZA VACCINE  08/01/2021   • ANNUAL PHYSICAL  01/27/2022     Patient presents today for follow-up of multiple chronic health conditions he does not wish to have his annual physical done today.  Diabetes has been under good control the blood pressure is almost under control he will home monitor and send us the results.  Refill of for [employer] 10 and Cialis 10 were given today.  Patient did wish to try Cialis again to help with the ED he has used it successfully in the past we did give him 12 of the 10 mg tablets.  History of Present Illness     Vitals:    02/01/22 0810 02/01/22 0812   BP: 135/91 120/88   BP Location: Right arm Left arm   Patient Position: Sitting Sitting   Cuff Size: Adult Adult   Pulse: 77    Temp: 98.2 °F (36.8 °C)    TempSrc: Temporal    SpO2: 96%    Weight: 114 kg (252 lb)    Height: 182.9 cm (72\")      Body mass index is 34.18 kg/m².    Current Outpatient Medications on File Prior to Visit   Medication Sig Dispense Refill   • allopurinol (ZYLOPRIM) 100 MG tablet Take 1 tablet by mouth Daily. 90 tablet 3   • amLODIPine (NORVASC) 10 MG tablet TAKE 1 TABLET BY MOUTH DAILY 90 tablet 1   • cloNIDine (CATAPRES) 0.1 MG tablet      • diphenhydrAMINE HCl, Sleep, (Sleep Aid) 50 MG capsule      • multivitamin (Multi-Vitamin Daily) tablet tablet      • omeprazole (priLOSEC) 40 MG capsule TAKE 1 CAPSULE BY MOUTH DAILY 90 capsule 1   • atorvastatin (LIPITOR) 20 MG tablet Take 1 tablet by mouth Daily. 90 tablet 3   • [DISCONTINUED] aspirin 81 MG EC " tablet Take 81 mg by mouth Daily.     • [DISCONTINUED] Farxiga 10 MG tablet TAKE 1 TABLET BY MOUTH DAILY 90 tablet 2     No current facility-administered medications on file prior to visit.       The following portions of the patient's history were reviewed and updated as appropriate: allergies, current medications, past family history, past medical history, past social history, past surgical history and problem list.    Review of Systems   Gastrointestinal: Positive for indigestion.   Genitourinary: Positive for erectile dysfunction.   Psychiatric/Behavioral: Positive for depressed mood.       Objective   Physical Exam  Vitals reviewed.   Constitutional:       General: He is not in acute distress.     Appearance: He is well-developed. He is obese. He is not ill-appearing or toxic-appearing.   HENT:      Head: Normocephalic and atraumatic.      Right Ear: Tympanic membrane, ear canal and external ear normal.      Left Ear: Tympanic membrane, ear canal and external ear normal.      Nose: Nose normal.   Eyes:      Extraocular Movements: Extraocular movements intact.      Conjunctiva/sclera: Conjunctivae normal.      Pupils: Pupils are equal, round, and reactive to light.   Cardiovascular:      Rate and Rhythm: Normal rate and regular rhythm.      Heart sounds: Normal heart sounds.   Pulmonary:      Effort: Pulmonary effort is normal.      Comments: Decreased breath sounds bilaterally  Abdominal:      General: Bowel sounds are normal. There is no distension.      Palpations: Abdomen is soft. There is no mass.      Tenderness: There is no abdominal tenderness.   Musculoskeletal:         General: Normal range of motion.      Cervical back: Neck supple.   Skin:     General: Skin is warm.   Neurological:      General: No focal deficit present.      Mental Status: He is alert and oriented to person, place, and time.   Psychiatric:         Mood and Affect: Mood normal.         Behavior: Behavior normal.       PHQ-9 Total  Score: 0    Assessment/Plan   Diagnoses and all orders for this visit:    1. DM type 2, goal A1C to be determined ([facility]) (Primary)  Comments:  Patient has not noted any sugars below 100 or over 200.  Orders:  -     Comprehensive Metabolic Panel  -     Hemoglobin A1c  -     Microalbumin / Creatinine Urine Ratio - Urine, Clean Catch  -     TSH  -     Vitamin B12    2. Secondary hypertension  Comments:  Blood pressures under fair control patient will home monitor and send us the results  Orders:  -     CBC Auto Differential    3. Chronic gout of foot, unspecified cause, unspecified laterality  Comments:  No complaints of inflammatory arthritis or kidney stone  Orders:  -     Uric Acid    4. Substance abuse (HCC)  Comments:  Patient states use    5. Gastroesophageal reflux disease, unspecified whether esophagitis present  Comments:  No heartburns once he takes his omeprazole.  Orders:  -     Magnesium    6. Other hyperlipidemia  Comments:  Try to eat less saturated fats  Orders:  -     Lipid Panel    7. Class 1 obesity due to excess calories with serious comorbidity and body mass index (BMI) of 34.0 to 34.9 in adult    8. Other male erectile dysfunction  Comments:  Patient has used Cialis effectively in the past we did give him 12 tablets today for ED.    Other orders  -     Farxiga 10 MG tablet; Take 10 mg by mouth Daily.  Dispense: 90 tablet; Refill: 2  -     tadalafil (Cialis) 10 MG tablet; Take 1 tablet by mouth Daily As Needed for Erectile Dysfunction.  Dispense: 12 tablet; Refill: 1        Patient Instructions     Health Maintenance Due   Topic Date Due   • Pneumococcal Vaccine 0-64 (1 of 2 - PPSV23) Never done   • Hepatitis B (1 of 3 - Risk 3-dose series) Never done   • TDAP/TD VACCINES (1 - Tdap) Never done   • ZOSTER VACCINE (1 of 2) Never done   • DIABETIC EYE EXAM  Never done   • INFLUENZA VACCINE  08/01/2021   • ANNUAL PHYSICAL  01/27/2022

## 2022-02-01 NOTE — PROGRESS NOTES
Venipuncture Blood Specimen Collection  Venipuncture performed in right arm by Alyssia Haider MA with good hemostasis. Patient tolerated the procedure well without complications.   02/01/22   Alyssia Haider MA

## 2022-02-02 ENCOUNTER — TELEPHONE (OUTPATIENT)
Dept: FAMILY MEDICINE CLINIC | Facility: CLINIC | Age: 52
End: 2022-02-02

## 2022-02-02 LAB
ALBUMIN UR-MCNC: 1.5 MG/DL
CREAT UR-MCNC: 107.9 MG/DL
MICROALBUMIN/CREAT UR: 13.9 MG/G

## 2022-02-02 NOTE — PROGRESS NOTES
Glucose 135 with A1C showing good control at 6.4.  Total and bad cholesterol are both elevated Still-let's increase Atorvastatin if he can tolerate-let me know-or did the 40 mg bother him?

## 2022-02-02 NOTE — TELEPHONE ENCOUNTER
HUB TO READ  ----- Message from Ruth Owen MD sent at 2/2/2022  4:17 PM EST -----  Glucose 135 with A1C showing good control at 6.4.  Total and bad cholesterol are both elevated Still-let's increase Atorvastatin if he can tolerate-let me know-or did the 40 mg bother him?

## 2022-02-03 DIAGNOSIS — E78.5 HYPERLIPIDEMIA, UNSPECIFIED HYPERLIPIDEMIA TYPE: Primary | ICD-10-CM

## 2022-04-11 ENCOUNTER — TELEPHONE (OUTPATIENT)
Dept: FAMILY MEDICINE CLINIC | Facility: CLINIC | Age: 52
End: 2022-04-11

## 2022-06-19 ENCOUNTER — TELEPHONE (OUTPATIENT)
Dept: FAMILY MEDICINE CLINIC | Facility: CLINIC | Age: 52
End: 2022-06-19

## 2022-06-19 NOTE — TELEPHONE ENCOUNTER
Return to call to patient's wife due to positive home COVID test x2.  Patient was considering PACs lipid however he does feel somewhat improved today and will call back within the 5-day window if he wishes to proceed with the medication.  Patient does have a court date tomorrow and we will give him an excuse that his COVID test was positive.  Wife will call first thing Monday morning to tell us where to send the note.  Patient is wife is a nurse at the [employer] so we will also give her a note that  is COVID-positive and she has been exposed to him.  Patient has been advised to quarantine rest drink plenty of fluids call 911 if he gets short of breath and call back if he wishes to consider the Paxil if it.  Patient has been instructed not to get his second COVID booster fourth to to 12 weeks.

## 2022-06-29 RX ORDER — OMEPRAZOLE 40 MG/1
40 CAPSULE, DELAYED RELEASE ORAL DAILY
Qty: 90 CAPSULE | Refills: 1 | Status: SHIPPED | OUTPATIENT
Start: 2022-06-29 | End: 2022-12-30

## 2022-07-08 RX ORDER — AMLODIPINE BESYLATE 10 MG/1
10 TABLET ORAL DAILY
Qty: 90 TABLET | Refills: 1 | Status: SHIPPED | OUTPATIENT
Start: 2022-07-08 | End: 2023-04-02 | Stop reason: SDUPTHER

## 2022-08-19 RX ORDER — DAPAGLIFLOZIN 10 MG/1
TABLET, FILM COATED ORAL
Qty: 90 TABLET | Refills: 2 | Status: SHIPPED | OUTPATIENT
Start: 2022-08-19

## 2022-10-20 RX ORDER — ALLOPURINOL 100 MG/1
100 TABLET ORAL DAILY
Qty: 90 TABLET | Refills: 0 | Status: SHIPPED | OUTPATIENT
Start: 2022-10-20 | End: 2023-01-27

## 2022-11-30 ENCOUNTER — TELEPHONE (OUTPATIENT)
Dept: FAMILY MEDICINE CLINIC | Facility: CLINIC | Age: 52
End: 2022-11-30

## 2022-11-30 NOTE — TELEPHONE ENCOUNTER
Discussed with patient.  He is retired doesn't need work note.  Offered PCR testing to determine if he is a fit for PAXLOVID.  Patient not interested in Paxlovid.

## 2022-11-30 NOTE — TELEPHONE ENCOUNTER
Caller: Talon Zelaya    Relationship to patient: Self    Best call back number: [phone]    Date of positive COVID19 test: 11/29    COVID19 symptoms: BODY ACHES, FEVER, STUFFY NOSE, LOSING TASTE    Additional information or concerns: PLEASE ADVISE ON QUARANTINE TIMES. CAN DR VALENCIA WRITE A WORK NOTE FOR HIM? PLEASE ADVISE, HE CAN COME BY TO PICK IT UP IF SHE CAN.     What is the patients preferred pharmacy: [id number] - [place], [place] - [address] AT [facility] [address] - [phone]  - [phone] FX

## 2022-12-30 RX ORDER — OMEPRAZOLE 40 MG/1
40 CAPSULE, DELAYED RELEASE ORAL DAILY
Qty: 90 CAPSULE | Refills: 0 | Status: SHIPPED | OUTPATIENT
Start: 2022-12-30 | End: 2023-04-02 | Stop reason: SDUPTHER

## 2023-01-25 NOTE — PATIENT INSTRUCTIONS
Health Maintenance Due   Topic Date Due    Hepatitis B (1 of 3 - 3-dose series) Never done    Pneumococcal Vaccine 0-64 (1 - PCV) Never done    TDAP/TD VACCINES (1 - Tdap) Never done    ZOSTER VACCINE (1 of 2) Never done    DIABETIC EYE EXAM  Never done    COVID-19 Vaccine (4 - Booster) 01/18/2022    ANNUAL PHYSICAL  01/26/2022    INFLUENZA VACCINE  08/01/2022    HEMOGLOBIN A1C  08/01/2022    DIABETIC FOOT EXAM  10/08/2022    Check blood pressure cuff for accuracy and send 10 blood pressures over 2 weeks.  Watch sodium, alcohol and weight

## 2023-01-25 NOTE — ASSESSMENT & PLAN NOTE
Diabetes is unchanged.   Reminded to get yearly retinal exam.  Diabetes will be reassessed in 3 months.

## 2023-01-26 ENCOUNTER — OFFICE VISIT (OUTPATIENT)
Dept: FAMILY MEDICINE CLINIC | Facility: CLINIC | Age: 53
End: 2023-01-26
Payer: COMMERCIAL

## 2023-01-26 VITALS
SYSTOLIC BLOOD PRESSURE: 136 MMHG | DIASTOLIC BLOOD PRESSURE: 95 MMHG | OXYGEN SATURATION: 96 % | WEIGHT: 252 LBS | TEMPERATURE: 98.4 F | BODY MASS INDEX: 34.13 KG/M2 | HEART RATE: 90 BPM | HEIGHT: 72 IN

## 2023-01-26 DIAGNOSIS — F19.10 SUBSTANCE ABUSE: ICD-10-CM

## 2023-01-26 DIAGNOSIS — M1A.0790 CHRONIC GOUT OF FOOT, UNSPECIFIED CAUSE, UNSPECIFIED LATERALITY: ICD-10-CM

## 2023-01-26 DIAGNOSIS — E66.09 CLASS 1 OBESITY DUE TO EXCESS CALORIES WITH SERIOUS COMORBIDITY AND BODY MASS INDEX (BMI) OF 34.0 TO 34.9 IN ADULT: ICD-10-CM

## 2023-01-26 DIAGNOSIS — I15.9 SECONDARY HYPERTENSION: ICD-10-CM

## 2023-01-26 DIAGNOSIS — E11.9 DM TYPE 2, GOAL A1C TO BE DETERMINED: ICD-10-CM

## 2023-01-26 DIAGNOSIS — K21.9 GASTROESOPHAGEAL REFLUX DISEASE, UNSPECIFIED WHETHER ESOPHAGITIS PRESENT: ICD-10-CM

## 2023-01-26 DIAGNOSIS — Z00.01 ENCOUNTER FOR ANNUAL GENERAL MEDICAL EXAMINATION WITH ABNORMAL FINDINGS IN ADULT: Primary | ICD-10-CM

## 2023-01-26 DIAGNOSIS — N52.8 OTHER MALE ERECTILE DYSFUNCTION: ICD-10-CM

## 2023-01-26 DIAGNOSIS — R13.19 ESOPHAGEAL DYSPHAGIA: ICD-10-CM

## 2023-01-26 DIAGNOSIS — E55.9 VITAMIN D DEFICIENCY: ICD-10-CM

## 2023-01-26 DIAGNOSIS — E78.49 OTHER HYPERLIPIDEMIA: ICD-10-CM

## 2023-01-26 LAB
25(OH)D3 SERPL-MCNC: 39.3 NG/ML (ref 30–100)
ALBUMIN SERPL-MCNC: 4.9 G/DL (ref 3.5–5.2)
ALBUMIN UR-MCNC: 1.4 MG/DL
ALBUMIN/GLOB SERPL: 1.8 G/DL
ALP SERPL-CCNC: 145 U/L (ref 39–117)
ALT SERPL W P-5'-P-CCNC: 39 U/L (ref 1–41)
ANION GAP SERPL CALCULATED.3IONS-SCNC: 13 MMOL/L (ref 5–15)
AST SERPL-CCNC: 33 U/L (ref 1–40)
BASOPHILS # BLD AUTO: 0.08 10*3/MM3 (ref 0–0.2)
BASOPHILS NFR BLD AUTO: 1 % (ref 0–1.5)
BILIRUB SERPL-MCNC: 0.5 MG/DL (ref 0–1.2)
BUN SERPL-MCNC: 10 MG/DL (ref 6–20)
BUN/CREAT SERPL: 8.8 (ref 7–25)
CALCIUM SPEC-SCNC: 9.8 MG/DL (ref 8.6–10.5)
CHLORIDE SERPL-SCNC: 103 MMOL/L (ref 98–107)
CHOLEST SERPL-MCNC: 246 MG/DL (ref 0–200)
CO2 SERPL-SCNC: 20 MMOL/L (ref 22–29)
CREAT SERPL-MCNC: 1.13 MG/DL (ref 0.76–1.27)
CREAT UR-MCNC: 129.4 MG/DL
DEPRECATED RDW RBC AUTO: 48 FL (ref 37–54)
EGFRCR SERPLBLD CKD-EPI 2021: 77.2 ML/MIN/1.73
EOSINOPHIL # BLD AUTO: 0.22 10*3/MM3 (ref 0–0.4)
EOSINOPHIL NFR BLD AUTO: 2.7 % (ref 0.3–6.2)
ERYTHROCYTE [DISTWIDTH] IN BLOOD BY AUTOMATED COUNT: 16.5 % (ref 12.3–15.4)
GLOBULIN UR ELPH-MCNC: 2.8 GM/DL
GLUCOSE SERPL-MCNC: 115 MG/DL (ref 65–99)
HBA1C MFR BLD: 6.5 % (ref 3.5–5.6)
HCT VFR BLD AUTO: 49.6 % (ref 37.5–51)
HDLC SERPL-MCNC: 45 MG/DL (ref 40–60)
HGB BLD-MCNC: 16.7 G/DL (ref 13–17.7)
IMM GRANULOCYTES # BLD AUTO: 0.02 10*3/MM3 (ref 0–0.05)
IMM GRANULOCYTES NFR BLD AUTO: 0.2 % (ref 0–0.5)
LDLC SERPL CALC-MCNC: 149 MG/DL (ref 0–100)
LDLC/HDLC SERPL: 3.2 {RATIO}
LYMPHOCYTES # BLD AUTO: 1.47 10*3/MM3 (ref 0.7–3.1)
LYMPHOCYTES NFR BLD AUTO: 18.1 % (ref 19.6–45.3)
MAGNESIUM SERPL-MCNC: 2.1 MG/DL (ref 1.6–2.6)
MCH RBC QN AUTO: 28.1 PG (ref 26.6–33)
MCHC RBC AUTO-ENTMCNC: 33.7 G/DL (ref 31.5–35.7)
MCV RBC AUTO: 83.5 FL (ref 79–97)
MICROALBUMIN/CREAT UR: 10.8 MG/G
MONOCYTES # BLD AUTO: 0.85 10*3/MM3 (ref 0.1–0.9)
MONOCYTES NFR BLD AUTO: 10.5 % (ref 5–12)
NEUTROPHILS NFR BLD AUTO: 5.47 10*3/MM3 (ref 1.7–7)
NEUTROPHILS NFR BLD AUTO: 67.5 % (ref 42.7–76)
NRBC BLD AUTO-RTO: 0.1 /100 WBC (ref 0–0.2)
PLATELET # BLD AUTO: 314 10*3/MM3 (ref 140–450)
PMV BLD AUTO: 10.7 FL (ref 6–12)
POTASSIUM SERPL-SCNC: 4.3 MMOL/L (ref 3.5–5.2)
PROT SERPL-MCNC: 7.7 G/DL (ref 6–8.5)
RBC # BLD AUTO: 5.94 10*6/MM3 (ref 4.14–5.8)
SODIUM SERPL-SCNC: 136 MMOL/L (ref 136–145)
TESTOST SERPL-MCNC: 336 NG/DL (ref 193–740)
TRIGL SERPL-MCNC: 285 MG/DL (ref 0–150)
TSH SERPL DL<=0.05 MIU/L-ACNC: 4.53 UIU/ML (ref 0.27–4.2)
URATE SERPL-MCNC: 4.7 MG/DL (ref 3.4–7)
VIT B12 BLD-MCNC: 776 PG/ML (ref 211–946)
VLDLC SERPL-MCNC: 52 MG/DL (ref 5–40)
WBC NRBC COR # BLD: 8.11 10*3/MM3 (ref 3.4–10.8)

## 2023-01-26 PROCEDURE — 83036 HEMOGLOBIN GLYCOSYLATED A1C: CPT | Performed by: PREVENTIVE MEDICINE

## 2023-01-26 PROCEDURE — 82043 UR ALBUMIN QUANTITATIVE: CPT | Performed by: PREVENTIVE MEDICINE

## 2023-01-26 PROCEDURE — 82570 ASSAY OF URINE CREATININE: CPT | Performed by: PREVENTIVE MEDICINE

## 2023-01-26 PROCEDURE — 82306 VITAMIN D 25 HYDROXY: CPT | Performed by: PREVENTIVE MEDICINE

## 2023-01-26 PROCEDURE — 99396 PREV VISIT EST AGE 40-64: CPT | Performed by: PREVENTIVE MEDICINE

## 2023-01-26 PROCEDURE — 80061 LIPID PANEL: CPT | Performed by: PREVENTIVE MEDICINE

## 2023-01-26 PROCEDURE — 99214 OFFICE O/P EST MOD 30 MIN: CPT | Performed by: PREVENTIVE MEDICINE

## 2023-01-26 PROCEDURE — 84550 ASSAY OF BLOOD/URIC ACID: CPT | Performed by: PREVENTIVE MEDICINE

## 2023-01-26 PROCEDURE — 83735 ASSAY OF MAGNESIUM: CPT | Performed by: PREVENTIVE MEDICINE

## 2023-01-26 PROCEDURE — 80050 GENERAL HEALTH PANEL: CPT | Performed by: PREVENTIVE MEDICINE

## 2023-01-26 PROCEDURE — 84403 ASSAY OF TOTAL TESTOSTERONE: CPT | Performed by: PREVENTIVE MEDICINE

## 2023-01-26 PROCEDURE — 82607 VITAMIN B-12: CPT | Performed by: PREVENTIVE MEDICINE

## 2023-01-26 NOTE — PROGRESS NOTES
"Brenda Zelaya is a 52 y.o. male presents for   Chief Complaint   Patient presents with   • Erectile Dysfunction   • hernia   Patient presents today for his annual wellness exam and has been advised to wear sunscreen and seatbelt.  He is also here to check on multiple chronic health conditions most of which are stable.He does note a new problem of swallowing solids.  He states that this is been going on for a year he has not actually choked but has been close.  We have advised that he does see the gastroenterologist for possible dilatation referral has been placed.    Health Maintenance Due   Topic Date Due   • Hepatitis B (1 of 3 - 3-dose series) Never done   • Pneumococcal Vaccine 0-64 (1 - PCV) Never done   • TDAP/TD VACCINES (1 - Tdap) Never done   • ZOSTER VACCINE (1 of 2) Never done   • DIABETIC EYE EXAM  Never done   • COVID-19 Vaccine (4 - Booster) 01/18/2022   • ANNUAL PHYSICAL  01/26/2022   • HEMOGLOBIN A1C  08/01/2022   • DIABETIC FOOT EXAM  10/08/2022       History of Present Illness     Vitals:    01/26/23 0921 01/26/23 0924   BP: (!) 150/101 136/95   BP Location: Right arm Left arm   Patient Position: Sitting Sitting   Cuff Size: Large Adult Large Adult   Pulse: 90    Temp: 98.4 °F (36.9 °C)    TempSrc: Tympanic    SpO2: 96%    Weight: 114 kg (252 lb)    Height: 182.9 cm (72.01\")      Body mass index is 34.17 kg/m².    Current Outpatient Medications on File Prior to Visit   Medication Sig Dispense Refill   • allopurinol (ZYLOPRIM) 100 MG tablet TAKE 1 TABLET BY MOUTH DAILY 90 tablet 0   • amLODIPine (NORVASC) 10 MG tablet TAKE 1 TABLET BY MOUTH DAILY 90 tablet 1   • cloNIDine (CATAPRES) 0.1 MG tablet      • diphenhydrAMINE HCl, Sleep, (Sleep Aid) 50 MG capsule      • Farxiga 10 MG tablet TAKE 1 TABLET BY MOUTH DAILY 90 tablet 2   • multivitamin (THERAGRAN) tablet tablet      • omeprazole (priLOSEC) 40 MG capsule TAKE 1 CAPSULE BY MOUTH DAILY 90 capsule 0   • [DISCONTINUED] atorvastatin " (LIPITOR) 20 MG tablet Take 1 tablet by mouth Daily. 90 tablet 3   • [DISCONTINUED] tadalafil (Cialis) 10 MG tablet Take 1 tablet by mouth Daily As Needed for Erectile Dysfunction. 12 tablet 1     No current facility-administered medications on file prior to visit.       The following portions of the patient's history were reviewed and updated as appropriate: allergies, current medications, past family history, past medical history, past social history, past surgical history and problem list.    Review of Systems   Gastrointestinal:        Dysphagia   Genitourinary: Positive for erectile dysfunction.       Objective   Physical Exam  Vitals reviewed.   Constitutional:       General: He is not in acute distress.     Appearance: He is well-developed. He is obese. He is not ill-appearing or toxic-appearing.   HENT:      Head: Normocephalic and atraumatic.      Right Ear: Tympanic membrane, ear canal and external ear normal.      Left Ear: Tympanic membrane, ear canal and external ear normal.      Nose: Nose normal.      Mouth/Throat:      Mouth: Mucous membranes are moist.      Pharynx: No posterior oropharyngeal erythema.   Eyes:      Extraocular Movements: Extraocular movements intact.      Conjunctiva/sclera: Conjunctivae normal.      Pupils: Pupils are equal, round, and reactive to light.   Cardiovascular:      Rate and Rhythm: Normal rate and regular rhythm.      Pulses:           Dorsalis pedis pulses are 1+ on the right side and 1+ on the left side.        Posterior tibial pulses are 1+ on the right side and 1+ on the left side.      Heart sounds: Normal heart sounds.   Pulmonary:      Effort: Pulmonary effort is normal.      Comments: Decreased breath sounds bilaterally  Abdominal:      General: Bowel sounds are normal. There is no distension.      Palpations: Abdomen is soft. There is no mass.      Tenderness: There is no abdominal tenderness.   Musculoskeletal:         General: Normal range of motion.       Cervical back: Neck supple.   Feet:      Right foot:      Protective Sensation: 10 sites tested. 10 sites sensed.      Skin integrity: Dry skin present.      Toenail Condition: Right toenails are normal.      Left foot:      Protective Sensation: 10 sites tested. 9 sites sensed.      Skin integrity: Dry skin present.      Toenail Condition: Left toenails are normal.      Comments: Diabetic Foot Exam Performed and Monofilament Test Performed    Skin:     General: Skin is warm.   Neurological:      General: No focal deficit present.      Mental Status: He is alert and oriented to person, place, and time.   Psychiatric:         Mood and Affect: Mood normal.         Behavior: Behavior normal.       PHQ-9 Total Score: 0    Assessment & Plan   Diagnoses and all orders for this visit:    1. Encounter for annual general medical examination with abnormal findings in adult (Primary)  Comments:  Advised to wear sun screen and seatbelt    2. Secondary hypertension  Comments:  Not controlled but didn't take meds today  Assessment & Plan:  Hypertension is unchanged.  Continue current treatment regimen.  Blood pressure will be reassessed in 3 months.    Orders:  -     CBC & Differential; Future  -     Comprehensive Metabolic Panel; Future  -     CBC & Differential  -     Comprehensive Metabolic Panel    3. DM type 2, goal A1C to be determined ([facility])  Assessment & Plan:  Diabetes is unchanged.   Reminded to get yearly retinal exam.  Diabetes will be reassessed in 3 months.    Orders:  -     Hemoglobin A1c; Future  -     Microalbumin / Creatinine Urine Ratio - Urine, Clean Catch; Future  -     TSH; Future  -     Vitamin B12; Future  -     Hemoglobin A1c  -     Microalbumin / Creatinine Urine Ratio - Urine, Clean Catch  -     TSH  -     Vitamin B12    4. Chronic gout of foot, unspecified cause, unspecified laterality  Assessment & Plan:  Patient has not noted any inflammatory arthritis or kidney stones.    Orders:  -     Uric Acid;  Future  -     Uric Acid    5. Substance abuse (HCC)    6. Gastroesophageal reflux disease, unspecified whether esophagitis present  Assessment & Plan:  Seems to be fairly well controlled.    Orders:  -     Magnesium; Future  -     Magnesium    7. Other hyperlipidemia  Assessment & Plan:  Lipid abnormalities are unchanged.  Nutritional counseling was provided.  Lipids will be reassessed in 3 months.    Orders:  -     Lipid Panel; Future  -     Lipid Panel    8. Vitamin D deficiency  Comments:  Doesn't take  Assessment & Plan:  Monitor and take as needed.    Orders:  -     Vitamin D,25-Hydroxy; Future  -     Vitamin D,25-Hydroxy    9. Other male erectile dysfunction  Assessment & Plan:  Patient seems to be having more difficulty with this.  Urology referral placed.    Orders:  -     Testosterone; Future  -     Testosterone    10. Esophageal dysphagia  -     Ambulatory referral for Screening EGD    11. Class 1 obesity due to excess calories with serious comorbidity and body mass index (BMI) of 34.0 to 34.9 in adult      Patient Instructions     Health Maintenance Due   Topic Date Due   • Hepatitis B (1 of 3 - 3-dose series) Never done   • Pneumococcal Vaccine 0-64 (1 - PCV) Never done   • TDAP/TD VACCINES (1 - Tdap) Never done   • ZOSTER VACCINE (1 of 2) Never done   • DIABETIC EYE EXAM  Never done   • COVID-19 Vaccine (4 - Booster) 01/18/2022   • ANNUAL PHYSICAL  01/26/2022   • INFLUENZA VACCINE  08/01/2022   • HEMOGLOBIN A1C  08/01/2022   • DIABETIC FOOT EXAM  10/08/2022    Check blood pressure cuff for accuracy and send 10 blood pressures over 2 weeks.  Watch sodium, alcohol and weight

## 2023-01-26 NOTE — PROGRESS NOTES
Venipuncture performed on Right Arm by Alyssia Haider MA  with good hemostasis. Patient tolerated well. 01/26/23

## 2023-01-27 ENCOUNTER — TELEPHONE (OUTPATIENT)
Dept: FAMILY MEDICINE CLINIC | Facility: CLINIC | Age: 53
End: 2023-01-27
Payer: COMMERCIAL

## 2023-01-27 RX ORDER — ALLOPURINOL 100 MG/1
100 TABLET ORAL DAILY
Qty: 90 TABLET | Refills: 0 | Status: SHIPPED | OUTPATIENT
Start: 2023-01-27

## 2023-01-27 NOTE — TELEPHONE ENCOUNTER
HUB TO READ:  ----- Message from Ruth Owen MD sent at 1/27/2023  7:01 AM EST -----  Glucose was 115 and A1c shows very good control at 6.5.  If you decide to start Ozempic you may need to watch very closely so your sugar does not go low so please let me know if you are willing to take this risk.  Thyroid stimulating hormone is slightly increased so we will monitor that again in 3 months as you may be developing a need for thyroid testosterone level was normal however total and bad cholesterol were elevated and I would like to see you take a statin.  We are suggesting atorvastatin please let me know if you agree.  Also watch her saturated fats and increase your walking.  People that are diabetic which by the way it does look like you are should be on low-dose statin anyway to decrease your risk for heart attack stroke and peripheral vascular disease please let me know about the above and if you have any other questions or concerns.

## 2023-01-27 NOTE — PROGRESS NOTES
Glucose was 115 and A1c shows very good control at 6.5.  If you decide to start Ozempic you may need to watch very closely so your sugar does not go low so please let me know if you are willing to take this risk.  Thyroid stimulating hormone is slightly increased so we will monitor that again in 3 months as you may be developing a need for thyroid testosterone level was normal however total and bad cholesterol were elevated and I would like to see you take a statin.  We are suggesting atorvastatin please let me know if you agree.  Also watch her saturated fats and increase your walking.  People that are diabetic which by the way it does look like you are should be on low-dose statin anyway to decrease your risk for heart attack stroke and peripheral vascular disease please let me know about the above and if you have any other questions or concerns.

## 2023-01-30 RX ORDER — PRAVASTATIN SODIUM 10 MG
10 TABLET ORAL DAILY
Qty: 90 TABLET | Refills: 1 | Status: SHIPPED | OUTPATIENT
Start: 2023-01-30

## 2023-02-06 ENCOUNTER — OFFICE (OUTPATIENT)
Dept: URBAN - METROPOLITAN AREA CLINIC 64 | Facility: CLINIC | Age: 53
End: 2023-02-06

## 2023-02-06 VITALS
DIASTOLIC BLOOD PRESSURE: 91 MMHG | HEART RATE: 91 BPM | SYSTOLIC BLOOD PRESSURE: 126 MMHG | WEIGHT: 249 LBS | HEIGHT: 72 IN

## 2023-02-06 DIAGNOSIS — R13.10 DYSPHAGIA, UNSPECIFIED: ICD-10-CM

## 2023-02-06 DIAGNOSIS — Z79.82 LONG TERM (CURRENT) USE OF ASPIRIN: ICD-10-CM

## 2023-02-06 DIAGNOSIS — K21.9 GASTRO-ESOPHAGEAL REFLUX DISEASE WITHOUT ESOPHAGITIS: ICD-10-CM

## 2023-02-06 PROCEDURE — 99214 OFFICE O/P EST MOD 30 MIN: CPT

## 2023-02-27 ENCOUNTER — ANESTHESIA EVENT (OUTPATIENT)
Dept: GASTROENTEROLOGY | Facility: HOSPITAL | Age: 53
End: 2023-02-27
Payer: COMMERCIAL

## 2023-02-27 ENCOUNTER — ANESTHESIA (OUTPATIENT)
Dept: GASTROENTEROLOGY | Facility: HOSPITAL | Age: 53
End: 2023-02-27
Payer: COMMERCIAL

## 2023-04-02 NOTE — TELEPHONE ENCOUNTER
Rx Refill Note  Requested Prescriptions     Pending Prescriptions Disp Refills   • amLODIPine (NORVASC) 10 MG tablet 90 tablet 1     Sig: Take 1 tablet by mouth Daily.   • omeprazole (priLOSEC) 40 MG capsule 90 capsule 0     Sig: Take 1 capsule by mouth Daily.      Last office visit with prescribing clinician: 1/26/2023      Next office visit with prescribing clinician: 4/27/2023   3}  Kanwal Mike  04/02/23, 19:27 EDT

## 2023-04-03 RX ORDER — AMLODIPINE BESYLATE 10 MG/1
10 TABLET ORAL DAILY
Qty: 90 TABLET | Refills: 1 | Status: SHIPPED | OUTPATIENT
Start: 2023-04-03

## 2023-04-03 RX ORDER — OMEPRAZOLE 40 MG/1
40 CAPSULE, DELAYED RELEASE ORAL DAILY
Qty: 90 CAPSULE | Refills: 0 | Status: SHIPPED | OUTPATIENT
Start: 2023-04-03

## 2023-07-07 PROBLEM — R35.1 NOCTURIA: Status: ACTIVE | Noted: 2023-07-07

## 2023-07-21 ENCOUNTER — TRANSCRIBE ORDERS (OUTPATIENT)
Dept: ADMINISTRATIVE | Facility: HOSPITAL | Age: 53
End: 2023-07-21
Payer: COMMERCIAL

## 2023-07-21 DIAGNOSIS — Z13.6 ENCOUNTER FOR SCREENING FOR VASCULAR DISEASE: Primary | ICD-10-CM

## 2023-07-29 RX ORDER — PRAVASTATIN SODIUM 10 MG
10 TABLET ORAL DAILY
Qty: 90 TABLET | Refills: 1 | Status: SHIPPED | OUTPATIENT
Start: 2023-07-29

## 2023-09-06 ENCOUNTER — CLINICAL SUPPORT (OUTPATIENT)
Dept: FAMILY MEDICINE CLINIC | Facility: CLINIC | Age: 53
End: 2023-09-06
Payer: COMMERCIAL

## 2023-09-06 DIAGNOSIS — R35.1 BPH ASSOCIATED WITH NOCTURIA: ICD-10-CM

## 2023-09-06 DIAGNOSIS — R74.8 ELEVATED LIVER ENZYMES: ICD-10-CM

## 2023-09-06 DIAGNOSIS — N40.1 BPH ASSOCIATED WITH NOCTURIA: ICD-10-CM

## 2023-09-06 DIAGNOSIS — E03.9 HYPOTHYROIDISM, UNSPECIFIED TYPE: ICD-10-CM

## 2023-09-06 LAB — T4 FREE SERPL-MCNC: 1.04 NG/DL (ref 0.93–1.7)

## 2023-09-06 PROCEDURE — 84439 ASSAY OF FREE THYROXINE: CPT | Performed by: PREVENTIVE MEDICINE

## 2023-09-06 PROCEDURE — 36415 COLL VENOUS BLD VENIPUNCTURE: CPT | Performed by: PREVENTIVE MEDICINE

## 2023-09-06 PROCEDURE — 84153 ASSAY OF PSA TOTAL: CPT | Performed by: PREVENTIVE MEDICINE

## 2023-09-06 PROCEDURE — 80053 COMPREHEN METABOLIC PANEL: CPT | Performed by: PREVENTIVE MEDICINE

## 2023-09-06 PROCEDURE — 82977 ASSAY OF GGT: CPT | Performed by: PREVENTIVE MEDICINE

## 2023-09-07 ENCOUNTER — TELEPHONE (OUTPATIENT)
Dept: FAMILY MEDICINE CLINIC | Facility: CLINIC | Age: 53
End: 2023-09-07
Payer: COMMERCIAL

## 2023-09-07 LAB
ALBUMIN SERPL-MCNC: 4.4 G/DL (ref 3.5–5.2)
ALBUMIN/GLOB SERPL: 1.3 G/DL
ALP SERPL-CCNC: 134 U/L (ref 39–117)
ALT SERPL W P-5'-P-CCNC: 34 U/L (ref 1–41)
ANION GAP SERPL CALCULATED.3IONS-SCNC: 14.5 MMOL/L (ref 5–15)
AST SERPL-CCNC: 29 U/L (ref 1–40)
BILIRUB SERPL-MCNC: 0.5 MG/DL (ref 0–1.2)
BUN SERPL-MCNC: 10 MG/DL (ref 6–20)
BUN/CREAT SERPL: 11 (ref 7–25)
CALCIUM SPEC-SCNC: 10 MG/DL (ref 8.6–10.5)
CHLORIDE SERPL-SCNC: 101 MMOL/L (ref 98–107)
CO2 SERPL-SCNC: 22.5 MMOL/L (ref 22–29)
CREAT SERPL-MCNC: 0.91 MG/DL (ref 0.76–1.27)
EGFRCR SERPLBLD CKD-EPI 2021: 100.2 ML/MIN/1.73
GGT SERPL-CCNC: 55 U/L (ref 8–61)
GLOBULIN UR ELPH-MCNC: 3.4 GM/DL
GLUCOSE SERPL-MCNC: 125 MG/DL (ref 65–99)
POTASSIUM SERPL-SCNC: 3.9 MMOL/L (ref 3.5–5.2)
PROT SERPL-MCNC: 7.8 G/DL (ref 6–8.5)
PSA SERPL-MCNC: 0.5 NG/ML (ref 0–4)
REFLEX: NORMAL
SODIUM SERPL-SCNC: 138 MMOL/L (ref 136–145)

## 2023-09-07 NOTE — TELEPHONE ENCOUNTER
HUB TO READ:  ----- Message from Ruth Owen MD sent at 9/7/2023  2:50 PM EDT -----  Blood sugar was 125 so continue to watch carbs increase your walking and take your glimepiride

## 2023-09-22 ENCOUNTER — PATIENT MESSAGE (OUTPATIENT)
Dept: FAMILY MEDICINE CLINIC | Facility: CLINIC | Age: 53
End: 2023-09-22
Payer: COMMERCIAL

## 2023-09-22 DIAGNOSIS — R13.19 ESOPHAGEAL DYSPHAGIA: ICD-10-CM

## 2023-09-22 DIAGNOSIS — R13.19 ESOPHAGEAL DYSPHAGIA: Primary | ICD-10-CM

## 2023-09-22 RX ORDER — OMEPRAZOLE 40 MG/1
40 CAPSULE, DELAYED RELEASE ORAL DAILY
Qty: 90 CAPSULE | Refills: 0 | Status: SHIPPED | OUTPATIENT
Start: 2023-09-22 | End: 2023-09-22 | Stop reason: SDUPTHER

## 2023-09-22 RX ORDER — OMEPRAZOLE 40 MG/1
40 CAPSULE, DELAYED RELEASE ORAL DAILY
Qty: 90 CAPSULE | Refills: 0 | Status: SHIPPED | OUTPATIENT
Start: 2023-09-22

## 2023-09-22 NOTE — TELEPHONE ENCOUNTER
----- Message from Talon Zelaya sent at 9/22/2023 12:16 PM EDT -----  Regarding: Omeprazole  Contact: [phone]  Hi,    I had all my medications switched to [facility] [employer] [facility] in [place]. For some reason I am still getting messages from [employer] saying that I have a prescription ready for omeprazole. I called [employer] about 2 months ago to have everything switched over but I guess they are still sending you the requests. Could you send a prescription to [facility] on [address] please ?  Thanks,  Talon

## 2023-09-26 RX ORDER — ALLOPURINOL 100 MG/1
100 TABLET ORAL DAILY
Qty: 90 TABLET | Refills: 0 | Status: SHIPPED | OUTPATIENT
Start: 2023-09-26

## 2023-09-26 RX ORDER — AMLODIPINE BESYLATE 10 MG/1
10 TABLET ORAL DAILY
Qty: 90 TABLET | Refills: 1 | Status: SHIPPED | OUTPATIENT
Start: 2023-09-26 | End: 2023-09-26 | Stop reason: SDUPTHER

## 2023-09-26 RX ORDER — AMLODIPINE BESYLATE 10 MG/1
10 TABLET ORAL DAILY
Qty: 90 TABLET | Refills: 1 | Status: SHIPPED | OUTPATIENT
Start: 2023-09-26

## 2023-09-26 RX ORDER — ALLOPURINOL 100 MG/1
100 TABLET ORAL DAILY
Qty: 90 TABLET | Refills: 0 | Status: SHIPPED | OUTPATIENT
Start: 2023-09-26 | End: 2023-09-26 | Stop reason: SDUPTHER

## 2023-09-26 RX ORDER — OMEPRAZOLE 40 MG/1
40 CAPSULE, DELAYED RELEASE ORAL DAILY
Qty: 90 CAPSULE | Refills: 0 | Status: SHIPPED | OUTPATIENT
Start: 2023-09-26

## 2023-09-26 NOTE — TELEPHONE ENCOUNTER
Rx Refill Note  Requested Prescriptions     Pending Prescriptions Disp Refills   • amLODIPine (NORVASC) 10 MG tablet [Pharmacy Med Name: AMLODIPINE BESYLATE 10MG TABLETS] 90 tablet 1     Sig: Take 1 tablet by mouth Daily.   • allopurinol (ZYLOPRIM) 100 MG tablet [Pharmacy Med Name: ALLOPURINOL 100MG TABLETS] 90 tablet 0     Sig: TAKE 1 TABLET BY MOUTH DAILY      Last office visit with prescribing clinician: 7/7/2023   Last telemedicine visit with prescribing clinician: Visit date not found   Next office visit with prescribing clinician: 1/8/2024       Would you like a call back once the refill request has been completed: [] Yes [] No    If the office needs to give you a call back, can they leave a voicemail: [] Yes [] No    Sharron Metzger MA  09/26/23, 07:45 EDT

## 2023-09-26 NOTE — TELEPHONE ENCOUNTER
From: Talon Zelaya  To: Ruth Owen  Sent: 9/22/2023 12:16 PM EDT  Subject: Omeprazole    Hi,   I had all my medications switched to [facility] [employer] [facility] in [place]. For some reason I am still getting messages from [employer] saying that I have a prescription ready for omeprazole. [facility] months ago to have everything switched over but I guess they are still sending you the requests. Could you send a prescription to [facility] on [address] please ?  Thanks,  Talon

## 2023-09-27 ENCOUNTER — PRIOR AUTHORIZATION (OUTPATIENT)
Dept: FAMILY MEDICINE CLINIC | Facility: CLINIC | Age: 53
End: 2023-09-27
Payer: COMMERCIAL

## 2023-09-27 NOTE — TELEPHONE ENCOUNTER
PA sent 09/27/23 for Farxiga 10MG tablets  Key: [id number]   PA Case ID: [id number]    Form  [employer] Electronic PA Form (2017 NCPDP)

## 2023-10-12 RX ORDER — ALLOPURINOL 100 MG/1
100 TABLET ORAL DAILY
Qty: 90 TABLET | Refills: 0 | Status: SHIPPED | OUTPATIENT
Start: 2023-10-12

## 2023-10-12 RX ORDER — AMLODIPINE BESYLATE 10 MG/1
10 TABLET ORAL DAILY
Qty: 90 TABLET | Refills: 1 | Status: SHIPPED | OUTPATIENT
Start: 2023-10-12

## 2023-10-12 NOTE — TELEPHONE ENCOUNTER
Rx Refill Note  Requested Prescriptions     Pending Prescriptions Disp Refills    allopurinol (ZYLOPRIM) 100 MG tablet 90 tablet 0     Sig: Take 1 tablet by mouth Daily.    amLODIPine (NORVASC) 10 MG tablet 90 tablet 1     Sig: Take 1 tablet by mouth Daily.      Last office visit with prescribing clinician: 7/7/2023   Last telemedicine visit with prescribing clinician: Visit date not found   Next office visit with prescribing clinician: 1/8/2024       Would you like a call back once the refill request has been completed: [] Yes [] No    If the office needs to give you a call back, can they leave a voicemail: [] Yes [] No    Sharron Metzger MA  10/12/23, 10:11 EDT

## 2023-12-20 DIAGNOSIS — R13.19 ESOPHAGEAL DYSPHAGIA: ICD-10-CM

## 2023-12-20 RX ORDER — OMEPRAZOLE 40 MG/1
40 CAPSULE, DELAYED RELEASE ORAL DAILY
Qty: 90 CAPSULE | Refills: 0 | Status: SHIPPED | OUTPATIENT
Start: 2023-12-20

## 2023-12-25 RX ORDER — GLIMEPIRIDE 1 MG/1
1 TABLET ORAL
Qty: 90 TABLET | Refills: 0 | Status: SHIPPED | OUTPATIENT
Start: 2023-12-25

## 2023-12-26 DIAGNOSIS — R13.19 ESOPHAGEAL DYSPHAGIA: ICD-10-CM

## 2023-12-26 RX ORDER — OMEPRAZOLE 40 MG/1
40 CAPSULE, DELAYED RELEASE ORAL DAILY
Qty: 90 CAPSULE | Refills: 0 | Status: SHIPPED | OUTPATIENT
Start: 2023-12-26

## 2023-12-26 NOTE — TELEPHONE ENCOUNTER
Pharmacy Name: [id number] - [facility] [place] - [address] [phone] [facility] [phone]      What medication are you calling in regards to: omeprazole (priLOSEC) 40 MG capsule [[id number]] (Order [id number])     What question does the pharmacy have: PLEASE TRANSFER TO [facility] FROM [facility]. PATIENT NO LONGER USES [employer]

## 2023-12-27 DIAGNOSIS — R13.19 ESOPHAGEAL DYSPHAGIA: ICD-10-CM

## 2023-12-27 RX ORDER — OMEPRAZOLE 40 MG/1
40 CAPSULE, DELAYED RELEASE ORAL DAILY
Qty: 90 CAPSULE | Refills: 0 | OUTPATIENT
Start: 2023-12-27

## 2024-01-02 RX ORDER — PRAVASTATIN SODIUM 10 MG
10 TABLET ORAL DAILY
Qty: 90 TABLET | Refills: 0 | Status: SHIPPED | OUTPATIENT
Start: 2024-01-02

## 2024-01-05 NOTE — PATIENT INSTRUCTIONS
Health Maintenance Due   Topic Date Due    Hepatitis B (1 of 3 - 3-dose series) Never done    TDAP/TD VACCINES (1 - Tdap) Never done    DIABETIC EYE EXAM  Never done    BMI FOLLOWUP  10/08/2022    COVID-19 Vaccine (5 - 2023-24 season) 09/01/2023    Ask wife about Hep B vaccination    12 hour fast for labs

## 2024-01-08 ENCOUNTER — OFFICE VISIT (OUTPATIENT)
Dept: FAMILY MEDICINE CLINIC | Facility: CLINIC | Age: 54
End: 2024-01-08
Payer: COMMERCIAL

## 2024-01-08 ENCOUNTER — PRIOR AUTHORIZATION (OUTPATIENT)
Dept: FAMILY MEDICINE CLINIC | Facility: CLINIC | Age: 54
End: 2024-01-08

## 2024-01-08 VITALS
DIASTOLIC BLOOD PRESSURE: 86 MMHG | HEIGHT: 72 IN | TEMPERATURE: 98 F | BODY MASS INDEX: 35.13 KG/M2 | SYSTOLIC BLOOD PRESSURE: 121 MMHG | OXYGEN SATURATION: 96 % | HEART RATE: 83 BPM | WEIGHT: 259.4 LBS

## 2024-01-08 DIAGNOSIS — M1A.0790 CHRONIC GOUT OF FOOT, UNSPECIFIED CAUSE, UNSPECIFIED LATERALITY: ICD-10-CM

## 2024-01-08 DIAGNOSIS — E11.9 DM TYPE 2, GOAL A1C TO BE DETERMINED: ICD-10-CM

## 2024-01-08 DIAGNOSIS — E78.49 OTHER HYPERLIPIDEMIA: ICD-10-CM

## 2024-01-08 DIAGNOSIS — E66.01 CLASS 2 SEVERE OBESITY DUE TO EXCESS CALORIES WITH SERIOUS COMORBIDITY AND BODY MASS INDEX (BMI) OF 35.0 TO 35.9 IN ADULT: ICD-10-CM

## 2024-01-08 DIAGNOSIS — F19.10 SUBSTANCE ABUSE: ICD-10-CM

## 2024-01-08 DIAGNOSIS — K21.9 GASTROESOPHAGEAL REFLUX DISEASE, UNSPECIFIED WHETHER ESOPHAGITIS PRESENT: ICD-10-CM

## 2024-01-08 DIAGNOSIS — I15.9 SECONDARY HYPERTENSION: Primary | ICD-10-CM

## 2024-01-08 DIAGNOSIS — R35.1 NOCTURIA: ICD-10-CM

## 2024-01-08 PROBLEM — E66.812 CLASS 2 SEVERE OBESITY WITH SERIOUS COMORBIDITY AND BODY MASS INDEX (BMI) OF 35.0 TO 35.9 IN ADULT: Status: ACTIVE | Noted: 2021-10-08

## 2024-01-08 PROCEDURE — 99214 OFFICE O/P EST MOD 30 MIN: CPT | Performed by: PREVENTIVE MEDICINE

## 2024-01-08 RX ORDER — DOXYCYCLINE 100 MG/1
1 TABLET ORAL EVERY 12 HOURS SCHEDULED
COMMUNITY
Start: 2023-09-15 | End: 2024-01-08

## 2024-01-08 RX ORDER — OMEPRAZOLE 20 MG/1
20 CAPSULE, DELAYED RELEASE ORAL DAILY
Qty: 90 CAPSULE | Refills: 3 | Status: SHIPPED | OUTPATIENT
Start: 2024-01-08

## 2024-01-08 RX ORDER — DESMOPRESSIN ACETATE 0.2 MG/1
TABLET ORAL
COMMUNITY
Start: 2023-12-28

## 2024-01-08 NOTE — PROGRESS NOTES
Brenda Zelaya is a 53 y.o. male presents for   Chief Complaint   Patient presents with    Diabetes     Patient is not fasting.         Health Maintenance Due   Topic Date Due    Hepatitis B (1 of 3 - 3-dose series) Never done    TDAP/TD VACCINES (1 - Tdap) Never done    DIABETIC EYE EXAM  Never done    COVID-19 Vaccine (5 - 2023-24 season) 09/01/2023    HEMOGLOBIN A1C  01/07/2024       DAXHPI    The patient is a 53-year-old male who is here today for hypertension, diabetes, chronic gout, substance abuse, GERD, hyperlipidemia, nocturia, class 2 severe obesity with serious comorbidities, and a body mass index of 35.    He is not fasting for lab work today. His wife checks his blood sugar every day at work. He denies any blood sugars below 100 mg/dL or over 200 mg/dL. He is overdue for his eye exam in 02/2024. He has 8 teeth left. He does not snore. His bowel movements are normal.  He is interested in trying Ozempic.    He is requesting a prescription for desmopressin so that he does not have to get up twice a night to urinate. He has been on this medication for 7 months. He denies any side effects.    He denies any trouble with his gout. He is on zaleprem for his gout.    He is okay with omeprazole 40 mg. He is wondering if he needs a high dose. He has tried famotidine 20 mg in the past, which worked well for him.    He quit drinking and drug use a long time ago.    He has experience a reaction to FARXIGA and LISINOPRIL.    He is still taking clonidine 0.1 mg for his blood pressure. He is taking pravastatin 10 mg for his cholesterol and trying to watch his saturated fat intake. He is on glimepiride 1 mg daily, taking Benadryl, melatonin.    He is up-to-date with an influenza and pneumonia vaccines.  Vitals:    01/08/24 0819 01/08/24 0820   BP: 132/95 121/86   BP Location: Right arm Left arm   Patient Position: Sitting Sitting   Cuff Size: Adult Adult   Pulse: 80 83   Temp: 98 °F (36.7 °C)    TempSrc:  "Temporal    SpO2: 96%    Weight: 118 kg (259 lb 6.4 oz)    Height: 182.9 cm (72.01\")      Body mass index is 35.17 kg/m².    Current Outpatient Medications on File Prior to Visit   Medication Sig Dispense Refill    allopurinol (ZYLOPRIM) 100 MG tablet Take 1 tablet by mouth Daily. 90 tablet 0    amLODIPine (NORVASC) 10 MG tablet Take 1 tablet by mouth Daily. 90 tablet 1    aspirin 81 MG oral suspension Take 81 mL by mouth 1 (One) Time.      cloNIDine (CATAPRES) 0.1 MG tablet       desmopressin (DDAVP) 0.2 MG tablet Take  by mouth every night at bedtime.      diphenhydrAMINE HCl, Sleep, (Sleep Aid) 50 MG capsule       glimepiride (AMARYL) 1 MG tablet TAKE 1 TABLET BY MOUTH ONCE DAILY IN THE MORNING BEFORE BREAKFAST 90 tablet 0    multivitamin (THERAGRAN) tablet tablet       pravastatin (PRAVACHOL) 10 MG tablet Take 1 tablet by mouth Daily. 90 tablet 0    [DISCONTINUED] doxycycline (ADOXA) 100 MG tablet Take 1 tablet by mouth Every 12 (Twelve) Hours.      [DISCONTINUED] omeprazole (priLOSEC) 40 MG capsule Take 1 capsule by mouth Daily. 90 capsule 0     No current facility-administered medications on file prior to visit.       The following portions of the patient's history were reviewed and updated as appropriate: allergies, current medications, past family history, past medical history, past social history, past surgical history, and problem list.    Review of Systems   Constitutional:  Negative for chills, diaphoresis, fever and unexpected weight loss.   HENT:  Negative for hearing loss and tinnitus.    Gastrointestinal:  Negative for blood in stool.   Genitourinary:  Negative for dysuria, hematuria and erectile dysfunction.   Musculoskeletal:  Negative for gait problem.   Neurological:  Negative for seizures and syncope.     DAXROS    Objective   Physical Exam  HENT:      Mouth/Throat:      Comments:  Throat is normal.   Neck:      Vascular: No carotid bruit.      Comments: No thyromegaly, thyroid masses, cervical " or suboccipital adenopathy.   Cardiovascular:      Rate and Rhythm: Normal rate and regular rhythm.      Comments: No murmur  Pulmonary:      Breath sounds: No wheezing, rhonchi or rales.      Comments: Breath sounds are equal but decreased in all six lung fields.      DAXEXAM  PHQ-9 Total Score: 0    Assessment & Plan   Diagnoses and all orders for this visit:    1. Secondary hypertension (Primary)  -     CBC Auto Differential    2. DM type 2, goal A1C to be determined  Comments:  Well controlled.  Orders:  -     Comprehensive Metabolic Panel  -     Hemoglobin A1c  -     Microalbumin / Creatinine Urine Ratio - Urine, Clean Catch  -     Vitamin B12  -     TSH    3. Chronic gout of foot, unspecified cause, unspecified laterality  Comments:  Denies any symptoms.  Orders:  -     Uric Acid    4. Substance abuse  Comments:  Denies any drug use.    5. Gastroesophageal reflux disease, unspecified whether esophagitis present  Comments:  He will receive a prescription for omeprazole.  Orders:  -     Magnesium    6. Other hyperlipidemia  Comments:  He was advised to watch his saturated fats.  Orders:  -     Lipid Panel    7. Nocturia  Comments:  Controlled with the use of desmopressin.  Orders:  -     Urinalysis With Culture If Indicated - Urine, Clean Catch    8. Class 2 severe obesity due to excess calories with serious comorbidity and body mass index (BMI) of 35.0 to 35.9 in adult  Comments:  He was advised to watch his portion size and increase his walking.    Other orders  -     omeprazole (priLOSEC) 20 MG capsule; Take 1 capsule by mouth Daily.  Dispense: 90 capsule; Refill: 3  -     Semaglutide-Weight Management 0.25 MG/0.5ML solution auto-injector; Inject 0.25 mg under the skin into the appropriate area as directed Every 7 (Seven) Days for 4 doses. Inject weekly for 4 weeks  Dispense: 2 mL; Refill: 0        DAXPLAN  DAXRESULTS    Patient Instructions     Health Maintenance Due   Topic Date Due    Hepatitis B (1 of 3 -  3-dose series) Never done    TDAP/TD VACCINES (1 - Tdap) Never done    DIABETIC EYE EXAM  Never done    BMI FOLLOWUP  10/08/2022    COVID-19 Vaccine (5 - 2023-24 season) 09/01/2023    Ask wife about Hep B vaccination    12 hour fast for labs       Transcribed from ambient dictation for Ruth Owen MD by Eunice Hui.  01/08/24   09:48 EST    Patient or patient representative verbalized consent to the visit recording.  I have personally performed the services described in this document as transcribed by the above individual, and it is both accurate and complete.    Answers submitted by the patient for this visit:  Primary Reason for Visit (Submitted on 1/1/2024)  What is the primary reason for your visit?: Other  Other (Submitted on 1/1/2024)  Please describe your symptoms.: F/u for chronic issues  Have you had these symptoms before?: Yes  How long have you been having these symptoms?: Greater than 2 weeks

## 2024-01-29 ENCOUNTER — PRIOR AUTHORIZATION (OUTPATIENT)
Dept: FAMILY MEDICINE CLINIC | Facility: CLINIC | Age: 54
End: 2024-01-29
Payer: COMMERCIAL

## 2024-01-29 NOTE — TELEPHONE ENCOUNTER
PA sent 01/29/24 for Wegovy.    Talon Zelaya (Key: [id number])  Rx #: [id number]  Wegovy 0.25MG/0.5ML auto-injectors  Form  [facility] Electronic PA Form (2017 NCPDP)

## 2024-01-30 NOTE — TELEPHONE ENCOUNTER
SOLOMON DOWNS (Key: [id number] - [id number]  Wegovy 0.25MG/0.5ML auto-injectors  Status: PA Response - Denied

## 2024-03-23 RX ORDER — ALLOPURINOL 100 MG/1
100 TABLET ORAL DAILY
Qty: 90 TABLET | Refills: 0 | Status: SHIPPED | OUTPATIENT
Start: 2024-03-23

## 2024-04-03 ENCOUNTER — LAB (OUTPATIENT)
Dept: LAB | Facility: HOSPITAL | Age: 54
End: 2024-04-03
Payer: COMMERCIAL

## 2024-04-03 LAB
ALBUMIN SERPL-MCNC: 4.6 G/DL (ref 3.5–5.2)
ALBUMIN UR-MCNC: <1.2 MG/DL
ALBUMIN/GLOB SERPL: 1.5 G/DL
ALP SERPL-CCNC: 128 U/L (ref 39–117)
ALT SERPL W P-5'-P-CCNC: 29 U/L (ref 1–41)
ANION GAP SERPL CALCULATED.3IONS-SCNC: 10.4 MMOL/L (ref 5–15)
AST SERPL-CCNC: 27 U/L (ref 1–40)
BASOPHILS # BLD AUTO: 0.07 10*3/MM3 (ref 0–0.2)
BASOPHILS NFR BLD AUTO: 1.1 % (ref 0–1.5)
BILIRUB SERPL-MCNC: 0.4 MG/DL (ref 0–1.2)
BILIRUB UR QL STRIP: NEGATIVE
BUN SERPL-MCNC: 10 MG/DL (ref 6–20)
BUN/CREAT SERPL: 9.3 (ref 7–25)
CALCIUM SPEC-SCNC: 9.8 MG/DL (ref 8.6–10.5)
CHLORIDE SERPL-SCNC: 105 MMOL/L (ref 98–107)
CHOLEST SERPL-MCNC: 178 MG/DL (ref 0–200)
CLARITY UR: CLEAR
CO2 SERPL-SCNC: 25.6 MMOL/L (ref 22–29)
COLOR UR: YELLOW
CREAT SERPL-MCNC: 1.07 MG/DL (ref 0.76–1.27)
CREAT UR-MCNC: 43 MG/DL
DEPRECATED RDW RBC AUTO: 44.8 FL (ref 37–54)
EGFRCR SERPLBLD CKD-EPI 2021: 82 ML/MIN/1.73
EOSINOPHIL # BLD AUTO: 0.18 10*3/MM3 (ref 0–0.4)
EOSINOPHIL NFR BLD AUTO: 2.9 % (ref 0.3–6.2)
ERYTHROCYTE [DISTWIDTH] IN BLOOD BY AUTOMATED COUNT: 13.6 % (ref 12.3–15.4)
GLOBULIN UR ELPH-MCNC: 3 GM/DL
GLUCOSE SERPL-MCNC: 101 MG/DL (ref 65–99)
GLUCOSE UR STRIP-MCNC: NEGATIVE MG/DL
HBA1C MFR BLD: 6.2 % (ref 4.8–5.6)
HCT VFR BLD AUTO: 47.8 % (ref 37.5–51)
HDLC SERPL-MCNC: 44 MG/DL (ref 40–60)
HGB BLD-MCNC: 15.6 G/DL (ref 13–17.7)
HGB UR QL STRIP.AUTO: NEGATIVE
HOLD SPECIMEN: NORMAL
IMM GRANULOCYTES # BLD AUTO: 0.01 10*3/MM3 (ref 0–0.05)
IMM GRANULOCYTES NFR BLD AUTO: 0.2 % (ref 0–0.5)
KETONES UR QL STRIP: NEGATIVE
LDLC SERPL CALC-MCNC: 117 MG/DL (ref 0–100)
LDLC/HDLC SERPL: 2.64 {RATIO}
LEUKOCYTE ESTERASE UR QL STRIP.AUTO: NEGATIVE
LYMPHOCYTES # BLD AUTO: 1.48 10*3/MM3 (ref 0.7–3.1)
LYMPHOCYTES NFR BLD AUTO: 24.2 % (ref 19.6–45.3)
MAGNESIUM SERPL-MCNC: 2.2 MG/DL (ref 1.6–2.6)
MCH RBC QN AUTO: 29.2 PG (ref 26.6–33)
MCHC RBC AUTO-ENTMCNC: 32.6 G/DL (ref 31.5–35.7)
MCV RBC AUTO: 89.5 FL (ref 79–97)
MICROALBUMIN/CREAT UR: NORMAL MG/G{CREAT}
MONOCYTES # BLD AUTO: 0.69 10*3/MM3 (ref 0.1–0.9)
MONOCYTES NFR BLD AUTO: 11.3 % (ref 5–12)
NEUTROPHILS NFR BLD AUTO: 3.69 10*3/MM3 (ref 1.7–7)
NEUTROPHILS NFR BLD AUTO: 60.3 % (ref 42.7–76)
NITRITE UR QL STRIP: NEGATIVE
NRBC BLD AUTO-RTO: 0 /100 WBC (ref 0–0.2)
PH UR STRIP.AUTO: 6.5 [PH] (ref 5–8)
PLATELET # BLD AUTO: 309 10*3/MM3 (ref 140–450)
PMV BLD AUTO: 10.8 FL (ref 6–12)
POTASSIUM SERPL-SCNC: 4.1 MMOL/L (ref 3.5–5.2)
PROT SERPL-MCNC: 7.6 G/DL (ref 6–8.5)
PROT UR QL STRIP: NEGATIVE
RBC # BLD AUTO: 5.34 10*6/MM3 (ref 4.14–5.8)
SODIUM SERPL-SCNC: 141 MMOL/L (ref 136–145)
SP GR UR STRIP: 1.01 (ref 1–1.03)
TRIGL SERPL-MCNC: 90 MG/DL (ref 0–150)
TSH SERPL DL<=0.05 MIU/L-ACNC: 5.55 UIU/ML (ref 0.27–4.2)
URATE SERPL-MCNC: 5.3 MG/DL (ref 3.4–7)
UROBILINOGEN UR QL STRIP: NORMAL
VIT B12 BLD-MCNC: 615 PG/ML (ref 211–946)
VLDLC SERPL-MCNC: 17 MG/DL (ref 5–40)
WBC NRBC COR # BLD AUTO: 6.12 10*3/MM3 (ref 3.4–10.8)

## 2024-04-03 PROCEDURE — 80061 LIPID PANEL: CPT | Performed by: PREVENTIVE MEDICINE

## 2024-04-03 PROCEDURE — 83735 ASSAY OF MAGNESIUM: CPT | Performed by: PREVENTIVE MEDICINE

## 2024-04-03 PROCEDURE — 80050 GENERAL HEALTH PANEL: CPT | Performed by: PREVENTIVE MEDICINE

## 2024-04-03 PROCEDURE — 81003 URINALYSIS AUTO W/O SCOPE: CPT | Performed by: PREVENTIVE MEDICINE

## 2024-04-03 PROCEDURE — 82607 VITAMIN B-12: CPT | Performed by: PREVENTIVE MEDICINE

## 2024-04-03 PROCEDURE — 82570 ASSAY OF URINE CREATININE: CPT | Performed by: PREVENTIVE MEDICINE

## 2024-04-03 PROCEDURE — 83036 HEMOGLOBIN GLYCOSYLATED A1C: CPT | Performed by: PREVENTIVE MEDICINE

## 2024-04-03 PROCEDURE — 84550 ASSAY OF BLOOD/URIC ACID: CPT | Performed by: PREVENTIVE MEDICINE

## 2024-04-03 PROCEDURE — 84439 ASSAY OF FREE THYROXINE: CPT | Performed by: PREVENTIVE MEDICINE

## 2024-04-03 PROCEDURE — 82043 UR ALBUMIN QUANTITATIVE: CPT | Performed by: PREVENTIVE MEDICINE

## 2024-04-04 DIAGNOSIS — E03.9 HYPOTHYROIDISM, UNSPECIFIED TYPE: Primary | ICD-10-CM

## 2024-04-05 DIAGNOSIS — E03.9 HYPOTHYROIDISM, UNSPECIFIED TYPE: Primary | ICD-10-CM

## 2024-04-05 LAB — T4 FREE SERPL-MCNC: 1.1 NG/DL (ref 0.93–1.7)

## 2024-04-07 RX ORDER — PRAVASTATIN SODIUM 40 MG
40 TABLET ORAL DAILY
Qty: 90 TABLET | Refills: 3 | Status: SHIPPED | OUTPATIENT
Start: 2024-04-07

## 2024-04-07 RX ORDER — PRAVASTATIN SODIUM 10 MG
10 TABLET ORAL DAILY
Qty: 90 TABLET | Refills: 0 | Status: SHIPPED | OUTPATIENT
Start: 2024-04-07 | End: 2024-04-07 | Stop reason: DRUGHIGH

## 2024-04-15 RX ORDER — GLIMEPIRIDE 1 MG/1
1 TABLET ORAL
Qty: 90 TABLET | Refills: 0 | Status: SHIPPED | OUTPATIENT
Start: 2024-04-15

## 2024-06-05 ENCOUNTER — TRANSCRIBE ORDERS (OUTPATIENT)
Dept: FAMILY MEDICINE CLINIC | Facility: CLINIC | Age: 54
End: 2024-06-05
Payer: COMMERCIAL

## 2024-06-05 DIAGNOSIS — N40.1 ENLARGED PROSTATE WITH URINARY OBSTRUCTION: Primary | ICD-10-CM

## 2024-06-05 DIAGNOSIS — N13.8 ENLARGED PROSTATE WITH URINARY OBSTRUCTION: Primary | ICD-10-CM

## 2024-06-05 DIAGNOSIS — R35.1 NOCTURIA: ICD-10-CM

## 2024-06-14 ENCOUNTER — TELEPHONE (OUTPATIENT)
Dept: FAMILY MEDICINE CLINIC | Facility: CLINIC | Age: 54
End: 2024-06-14
Payer: COMMERCIAL

## 2024-06-14 DIAGNOSIS — E78.49 OTHER HYPERLIPIDEMIA: ICD-10-CM

## 2024-06-14 DIAGNOSIS — K21.9 GASTROESOPHAGEAL REFLUX DISEASE, UNSPECIFIED WHETHER ESOPHAGITIS PRESENT: ICD-10-CM

## 2024-06-14 DIAGNOSIS — I15.9 SECONDARY HYPERTENSION: ICD-10-CM

## 2024-06-14 DIAGNOSIS — E11.9 DM TYPE 2, GOAL A1C TO BE DETERMINED: ICD-10-CM

## 2024-06-14 DIAGNOSIS — E55.9 VITAMIN D DEFICIENCY: Primary | ICD-10-CM

## 2024-06-14 NOTE — TELEPHONE ENCOUNTER
Caller: Talon Zelaya    Relationship: Self    Best call back number: [phone]     What orders are you requesting (i.e. lab or imaging): LABS     Where will you receive your lab/imaging services: OFFICE     Additional notes: PATIENT STATED HE WILL BE SEEING HIS UROLOGIST NEXT FRIDAY AND WILL BE NEEDING TO COMPLETE LAB WORK ON MONDAY. PATIENT STATED THAT AT THAT TIME, HE WOULD LIKE TO COMPLETE LABS FOR DR. VALENCIA AS WELL. PATIENT IS NEEDING LAB ORDERS PLEASE AND GIVE PATIENT A CALL BACK. PLEASE ADVISE.

## 2024-06-18 ENCOUNTER — LAB (OUTPATIENT)
Dept: FAMILY MEDICINE CLINIC | Facility: CLINIC | Age: 54
End: 2024-06-18
Payer: COMMERCIAL

## 2024-06-18 DIAGNOSIS — E11.9 DM TYPE 2, GOAL A1C TO BE DETERMINED: ICD-10-CM

## 2024-06-18 DIAGNOSIS — E78.49 OTHER HYPERLIPIDEMIA: ICD-10-CM

## 2024-06-18 DIAGNOSIS — I15.9 SECONDARY HYPERTENSION: ICD-10-CM

## 2024-06-18 DIAGNOSIS — K21.9 GASTROESOPHAGEAL REFLUX DISEASE, UNSPECIFIED WHETHER ESOPHAGITIS PRESENT: ICD-10-CM

## 2024-06-18 DIAGNOSIS — R35.1 NOCTURIA: ICD-10-CM

## 2024-06-18 DIAGNOSIS — N40.1 ENLARGED PROSTATE WITH URINARY OBSTRUCTION: ICD-10-CM

## 2024-06-18 DIAGNOSIS — E55.9 VITAMIN D DEFICIENCY: ICD-10-CM

## 2024-06-18 DIAGNOSIS — N13.8 ENLARGED PROSTATE WITH URINARY OBSTRUCTION: ICD-10-CM

## 2024-06-18 LAB
25(OH)D3 SERPL-MCNC: 46.4 NG/ML (ref 30–100)
ALBUMIN SERPL-MCNC: 4.5 G/DL (ref 3.5–5.2)
ALBUMIN/GLOB SERPL: 1.5 G/DL
ALP SERPL-CCNC: 115 U/L (ref 39–117)
ALT SERPL W P-5'-P-CCNC: 23 U/L (ref 1–41)
ANION GAP SERPL CALCULATED.3IONS-SCNC: 11.9 MMOL/L (ref 5–15)
ANION GAP SERPL CALCULATED.3IONS-SCNC: 13 MMOL/L (ref 5–15)
AST SERPL-CCNC: 21 U/L (ref 1–40)
BASOPHILS # BLD AUTO: 0.06 10*3/MM3 (ref 0–0.2)
BASOPHILS NFR BLD AUTO: 0.8 % (ref 0–1.5)
BILIRUB SERPL-MCNC: 0.5 MG/DL (ref 0–1.2)
BUN SERPL-MCNC: 11 MG/DL (ref 6–20)
BUN SERPL-MCNC: 12 MG/DL (ref 6–20)
BUN/CREAT SERPL: 10.3 (ref 7–25)
BUN/CREAT SERPL: 9.7 (ref 7–25)
CALCIUM SPEC-SCNC: 10.3 MG/DL (ref 8.6–10.5)
CALCIUM SPEC-SCNC: 10.3 MG/DL (ref 8.6–10.5)
CHLORIDE SERPL-SCNC: 107 MMOL/L (ref 98–107)
CHLORIDE SERPL-SCNC: 108 MMOL/L (ref 98–107)
CHOLEST SERPL-MCNC: 161 MG/DL (ref 0–200)
CO2 SERPL-SCNC: 22 MMOL/L (ref 22–29)
CO2 SERPL-SCNC: 23.1 MMOL/L (ref 22–29)
CREAT SERPL-MCNC: 1.13 MG/DL (ref 0.76–1.27)
CREAT SERPL-MCNC: 1.16 MG/DL (ref 0.76–1.27)
DEPRECATED RDW RBC AUTO: 43 FL (ref 37–54)
EGFRCR SERPLBLD CKD-EPI 2021: 74.4 ML/MIN/1.73
EGFRCR SERPLBLD CKD-EPI 2021: 76.8 ML/MIN/1.73
EOSINOPHIL # BLD AUTO: 0.25 10*3/MM3 (ref 0–0.4)
EOSINOPHIL NFR BLD AUTO: 3.5 % (ref 0.3–6.2)
ERYTHROCYTE [DISTWIDTH] IN BLOOD BY AUTOMATED COUNT: 13.1 % (ref 12.3–15.4)
GLOBULIN UR ELPH-MCNC: 3.1 GM/DL
GLUCOSE SERPL-MCNC: 91 MG/DL (ref 65–99)
GLUCOSE SERPL-MCNC: 92 MG/DL (ref 65–99)
HBA1C MFR BLD: 6 % (ref 4.8–5.6)
HCT VFR BLD AUTO: 46.6 % (ref 37.5–51)
HDLC SERPL-MCNC: 45 MG/DL (ref 40–60)
HGB BLD-MCNC: 15.3 G/DL (ref 13–17.7)
IMM GRANULOCYTES # BLD AUTO: 0.01 10*3/MM3 (ref 0–0.05)
IMM GRANULOCYTES NFR BLD AUTO: 0.1 % (ref 0–0.5)
LDLC SERPL CALC-MCNC: 100 MG/DL (ref 0–100)
LDLC/HDLC SERPL: 2.2 {RATIO}
LYMPHOCYTES # BLD AUTO: 1.96 10*3/MM3 (ref 0.7–3.1)
LYMPHOCYTES NFR BLD AUTO: 27.4 % (ref 19.6–45.3)
MAGNESIUM SERPL-MCNC: 2.2 MG/DL (ref 1.6–2.6)
MCH RBC QN AUTO: 29.3 PG (ref 26.6–33)
MCHC RBC AUTO-ENTMCNC: 32.8 G/DL (ref 31.5–35.7)
MCV RBC AUTO: 89.1 FL (ref 79–97)
MONOCYTES # BLD AUTO: 0.66 10*3/MM3 (ref 0.1–0.9)
MONOCYTES NFR BLD AUTO: 9.2 % (ref 5–12)
NEUTROPHILS NFR BLD AUTO: 4.22 10*3/MM3 (ref 1.7–7)
NEUTROPHILS NFR BLD AUTO: 59 % (ref 42.7–76)
NRBC BLD AUTO-RTO: 0 /100 WBC (ref 0–0.2)
PLATELET # BLD AUTO: 279 10*3/MM3 (ref 140–450)
PMV BLD AUTO: 11.3 FL (ref 6–12)
POTASSIUM SERPL-SCNC: 4.1 MMOL/L (ref 3.5–5.2)
POTASSIUM SERPL-SCNC: 4.2 MMOL/L (ref 3.5–5.2)
PROT SERPL-MCNC: 7.6 G/DL (ref 6–8.5)
RBC # BLD AUTO: 5.23 10*6/MM3 (ref 4.14–5.8)
SODIUM SERPL-SCNC: 142 MMOL/L (ref 136–145)
SODIUM SERPL-SCNC: 143 MMOL/L (ref 136–145)
T4 FREE SERPL-MCNC: 1.05 NG/DL (ref 0.92–1.68)
T4 FREE SERPL-MCNC: 1.08 NG/DL (ref 0.93–1.7)
TRIGL SERPL-MCNC: 84 MG/DL (ref 0–150)
TSH SERPL DL<=0.05 MIU/L-ACNC: 5.04 UIU/ML (ref 0.27–4.2)
VIT B12 BLD-MCNC: 707 PG/ML (ref 211–946)
VLDLC SERPL-MCNC: 16 MG/DL (ref 5–40)
WBC NRBC COR # BLD AUTO: 7.16 10*3/MM3 (ref 3.4–10.8)

## 2024-06-18 PROCEDURE — 83036 HEMOGLOBIN GLYCOSYLATED A1C: CPT | Performed by: PREVENTIVE MEDICINE

## 2024-06-18 PROCEDURE — 83735 ASSAY OF MAGNESIUM: CPT | Performed by: PREVENTIVE MEDICINE

## 2024-06-18 PROCEDURE — 82306 VITAMIN D 25 HYDROXY: CPT | Performed by: PREVENTIVE MEDICINE

## 2024-06-18 PROCEDURE — 84439 ASSAY OF FREE THYROXINE: CPT | Performed by: PREVENTIVE MEDICINE

## 2024-06-18 PROCEDURE — 82043 UR ALBUMIN QUANTITATIVE: CPT | Performed by: PREVENTIVE MEDICINE

## 2024-06-18 PROCEDURE — 82607 VITAMIN B-12: CPT | Performed by: PREVENTIVE MEDICINE

## 2024-06-18 PROCEDURE — 80061 LIPID PANEL: CPT | Performed by: PREVENTIVE MEDICINE

## 2024-06-18 PROCEDURE — 36415 COLL VENOUS BLD VENIPUNCTURE: CPT

## 2024-06-18 PROCEDURE — 80050 GENERAL HEALTH PANEL: CPT | Performed by: PREVENTIVE MEDICINE

## 2024-06-18 PROCEDURE — 84154 ASSAY OF PSA FREE: CPT | Performed by: UROLOGY

## 2024-06-18 PROCEDURE — 82570 ASSAY OF URINE CREATININE: CPT | Performed by: PREVENTIVE MEDICINE

## 2024-06-18 PROCEDURE — 80048 BASIC METABOLIC PNL TOTAL CA: CPT | Performed by: UROLOGY

## 2024-06-18 PROCEDURE — 84153 ASSAY OF PSA TOTAL: CPT | Performed by: UROLOGY

## 2024-06-19 LAB
ALBUMIN UR-MCNC: <1.2 MG/DL
CREAT UR-MCNC: 213.9 MG/DL
MICROALBUMIN/CREAT UR: NORMAL MG/G{CREAT}
PSA FREE MFR SERPL: 48 %
PSA FREE SERPL-MCNC: 0.24 NG/ML
PSA SERPL-MCNC: 0.5 NG/ML (ref 0–4)

## 2024-06-20 ENCOUNTER — TELEPHONE (OUTPATIENT)
Dept: FAMILY MEDICINE CLINIC | Facility: CLINIC | Age: 54
End: 2024-06-20
Payer: COMMERCIAL

## 2024-06-20 NOTE — TELEPHONE ENCOUNTER
RELAY----- Message from Ruth Owen sent at 6/20/2024  2:25 PM EDT -----  Glucose 92 with A1C at 6.0-shows good control of diabetes.  Bad cholesterol 100 and goal is 70-can you do more to decrease sat fats and walk?  We can increase Pravastatin to 80 if not.  TSH elevated but T4 normal-continue same dose thyroiud and we'll monitor.

## 2024-06-20 NOTE — PROGRESS NOTES
Glucose 92 with A1C at 6.0-shows good control of diabetes.  Bad cholesterol 100 and goal is 70-can you do more to decrease sat fats and walk?  We can increase Pravastatin to 80 if not.  TSH elevated but T4 normal-continue same dose thyroiud and we'll monitor.

## 2024-06-20 NOTE — TELEPHONE ENCOUNTER
----- Message from Ruth Owen sent at 6/20/2024  2:23 PM EDT -----  Labs look normal- keep up good work

## 2024-07-02 RX ORDER — PRAVASTATIN SODIUM 10 MG
10 TABLET ORAL DAILY
Qty: 90 TABLET | Refills: 0 | OUTPATIENT
Start: 2024-07-02

## 2024-07-11 NOTE — ASSESSMENT & PLAN NOTE
Patient's (There is no height or weight on file to calculate BMI.) indicates that they are obese (BMI >30) with health conditions that include hypertension, diabetes mellitus, dyslipidemias, and GERD . Weight is unchanged. BMI  is above average; BMI management plan is completed. We discussed portion control and increasing exercise.

## 2024-07-11 NOTE — PATIENT INSTRUCTIONS
Health Maintenance Due   Topic Date Due    DIABETIC EYE EXAM  Never done    Hepatitis B (1 of 3 - 19+ 3-dose series) Never done    TDAP/TD VACCINES (1 - Tdap) Never done    BMI FOLLOWUP  10/08/2022    COVID-19 Vaccine (5 - 2023-24 season) 09/01/2023    ANNUAL PHYSICAL  01/26/2024    DIABETIC FOOT EXAM  07/07/2024

## 2024-07-12 ENCOUNTER — OFFICE VISIT (OUTPATIENT)
Dept: FAMILY MEDICINE CLINIC | Facility: CLINIC | Age: 54
End: 2024-07-12
Payer: COMMERCIAL

## 2024-07-12 VITALS
BODY MASS INDEX: 31.34 KG/M2 | SYSTOLIC BLOOD PRESSURE: 128 MMHG | WEIGHT: 231.4 LBS | HEART RATE: 87 BPM | OXYGEN SATURATION: 96 % | TEMPERATURE: 98.2 F | HEIGHT: 72 IN | DIASTOLIC BLOOD PRESSURE: 85 MMHG

## 2024-07-12 DIAGNOSIS — I15.9 SECONDARY HYPERTENSION: ICD-10-CM

## 2024-07-12 DIAGNOSIS — R35.1 NOCTURIA: ICD-10-CM

## 2024-07-12 DIAGNOSIS — N52.8 OTHER MALE ERECTILE DYSFUNCTION: ICD-10-CM

## 2024-07-12 DIAGNOSIS — E55.9 VITAMIN D DEFICIENCY: ICD-10-CM

## 2024-07-12 DIAGNOSIS — M1A.0790 CHRONIC GOUT OF FOOT, UNSPECIFIED CAUSE, UNSPECIFIED LATERALITY: ICD-10-CM

## 2024-07-12 DIAGNOSIS — E66.09 CLASS 1 OBESITY DUE TO EXCESS CALORIES WITH SERIOUS COMORBIDITY AND BODY MASS INDEX (BMI) OF 31.0 TO 31.9 IN ADULT: ICD-10-CM

## 2024-07-12 DIAGNOSIS — K21.9 GASTROESOPHAGEAL REFLUX DISEASE, UNSPECIFIED WHETHER ESOPHAGITIS PRESENT: ICD-10-CM

## 2024-07-12 DIAGNOSIS — E11.9 DM TYPE 2, GOAL A1C TO BE DETERMINED: ICD-10-CM

## 2024-07-12 DIAGNOSIS — Z00.01 ENCOUNTER FOR ANNUAL GENERAL MEDICAL EXAMINATION WITH ABNORMAL FINDINGS IN ADULT: Primary | ICD-10-CM

## 2024-07-12 DIAGNOSIS — E11.43 TYPE 2 DIABETES MELLITUS WITH DIABETIC AUTONOMIC NEUROPATHY, WITHOUT LONG-TERM CURRENT USE OF INSULIN: ICD-10-CM

## 2024-07-12 DIAGNOSIS — E78.49 OTHER HYPERLIPIDEMIA: ICD-10-CM

## 2024-07-12 DIAGNOSIS — R13.19 ESOPHAGEAL DYSPHAGIA: ICD-10-CM

## 2024-07-12 DIAGNOSIS — F19.10 SUBSTANCE ABUSE: ICD-10-CM

## 2024-07-12 RX ORDER — GABAPENTIN 300 MG/1
300 CAPSULE ORAL 3 TIMES DAILY
Qty: 270 CAPSULE | Refills: 1 | Status: SHIPPED | OUTPATIENT
Start: 2024-07-12

## 2024-07-12 NOTE — PROGRESS NOTES
Brenda Zelaya is a 53 y.o. male presents for   Chief Complaint   Patient presents with    Annual Exam     Patient had labs completed recently.    Diabetes     Patient requesting for PCP to take over Gabapentin refills.        Health Maintenance Due   Topic Date Due    DIABETIC EYE EXAM  Never done    Hepatitis B (1 of 3 - 19+ 3-dose series) Never done    TDAP/TD VACCINES (1 - Tdap) Never done    COVID-19 Vaccine (5 - 2023-24 season) 09/01/2023    ANNUAL PHYSICAL  01/26/2024   Patient has been advised to wear sunscreen and a seatbelt.  Patient states that he is off all substance use including cigarettes alcohol and drugs except for THC which she does smoke nightly.  He is complaining of neuropathy in both feet that has been evaluated by the podiatrist and wishes that we will prescribe the gabapentin for him and we have agreed to do so.    Patient has lost 35 pounds with giving up soft drinks alcohol and eating a healthier diet.  He is no longer noticing things such as esophageal dysphagia and blood sugars have been under much better control as has blood pressure.    Diabetes       History of Present Illness  The patient is a 53-year-old male who is here today for an encounter for general medical examination with abnormal findings in an adult, vitamin D deficiency, substance abuse, secondary hypertension, male erectile dysfunction, hyperlipidemia, nocturia, chronic gout of the foot, GERD, type 2 diabetes, class 1 obesity due to excess calories, and esophageal dysphagia.    He underwent lab tests for a urologic visit, all of which showed improvement. He has lost approximately 30 pounds through dietary changes, including reducing his intake of carbohydrates and sweets, and abstaining from alcohol. He is aware of the importance of wearing sunscreen and a seatbelt. He is not currently taking a vitamin D supplement. His blood pressure is well-controlled at home. He is mindful of his saturated fat intake and is  "currently on Pravachol. His nocturia has improved, and he was prescribed desmopressin by his doctor. His heartburn is well-managed, and he has reduced his omeprazole dosage by half. He has not noticed any kidney stones or joint swelling. His blood sugar levels have not dropped below 100 or exceeded 200. He has been taking half a tablet of glipizide for the past 3 to 4 months due to low blood sugar levels, and has not had any episodes since. His swallowing has improved. He denies any changes in his hearing. He has seen his eye doctor and dentist in the past year. He denies snoring, coughing up blood, sputum, or wheezing. He denies chest pressure or heart flutters. He has no issues with bowel movements, persistent diarrhea, constipation, or blood in his urine. He denies fever, chills, or night sweats. He has experienced significant weight loss.    He saw Dr. Campbell for sharp pains in his feet, who prescribed gabapentin. The gabapentin was effective, and when the dose was increased once, he rarely experiences any pain in his feet. He is currently taking 1 capsule orally 3 times a day.   He still smokes pot just barely in the evening. 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 He has not used drugs in probably 3 to 4 years and has not drank in over a year now.   He is not allergic to any medications.    Vitals:    07/12/24 1306 07/12/24 1307   BP: 135/86 128/85   BP Location: Left arm Right arm   Patient Position: Sitting Sitting   Cuff Size: Adult Adult   Pulse: 81 87   Temp: 98.2 °F (36.8 °C)    TempSrc: Temporal    SpO2: 96%    Weight: 105 kg (231 lb 6.4 oz)    Height: 182.9 cm (72.01\")      Body mass index is 31.37 kg/m².    Current Outpatient Medications on File Prior to Visit   Medication Sig Dispense Refill    allopurinol (ZYLOPRIM) 100 MG tablet Take 1 tablet by mouth once daily 90 tablet 0    amLODIPine (NORVASC) 10 MG tablet Take 1 tablet by mouth Daily. 90 tablet 1    aspirin 81 MG oral " suspension Take 81 mL by mouth 1 (One) Time.      desmopressin (DDAVP) 0.2 MG tablet Take  by mouth every night at bedtime.      diphenhydrAMINE HCl, Sleep, (Sleep Aid) 50 MG capsule       glimepiride (AMARYL) 1 MG tablet TAKE 1 TABLET BY MOUTH ONCE DAILY IN THE MORNING BEFORE BREAKFAST 90 tablet 0    multivitamin (THERAGRAN) tablet tablet       omeprazole (priLOSEC) 20 MG capsule Take 1 capsule by mouth Daily. 90 capsule 3    pravastatin (Pravachol) 40 MG tablet Take 1 tablet by mouth Daily. 90 tablet 3     No current facility-administered medications on file prior to visit.       The following portions of the patient's history were reviewed and updated as appropriate: allergies, current medications, past family history, past medical history, past social history, past surgical history, and problem list.    Review of Systems   Gastrointestinal:  Negative for GERD.   Genitourinary:  Positive for erectile dysfunction.   Musculoskeletal:  Negative for arthralgias, joint swelling and myalgias.   Neurological:  Positive for numbness.       Objective   Physical Exam  Vitals reviewed.   Constitutional:       General: He is not in acute distress.     Appearance: He is well-developed. He is obese. He is not ill-appearing or toxic-appearing.   HENT:      Head: Normocephalic and atraumatic.      Right Ear: Tympanic membrane, ear canal and external ear normal.      Left Ear: Tympanic membrane, ear canal and external ear normal.      Nose: Nose normal.      Mouth/Throat:      Mouth: Mucous membranes are moist.      Pharynx: No posterior oropharyngeal erythema.   Eyes:      Extraocular Movements: Extraocular movements intact.      Conjunctiva/sclera: Conjunctivae normal.      Pupils: Pupils are equal, round, and reactive to light.   Cardiovascular:      Rate and Rhythm: Normal rate and regular rhythm.      Pulses:           Dorsalis pedis pulses are 1+ on the right side and 1+ on the left side.        Posterior tibial pulses are  1+ on the right side and 1+ on the left side.      Heart sounds: Normal heart sounds.   Pulmonary:      Effort: Pulmonary effort is normal.      Comments: Decreased breath sounds bilaterally  Abdominal:      General: Bowel sounds are normal. There is no distension.      Palpations: Abdomen is soft. There is no mass.      Tenderness: There is no abdominal tenderness.   Musculoskeletal:         General: Normal range of motion.      Cervical back: Neck supple.   Feet:      Right foot:      Protective Sensation: 10 sites tested.  10 sites sensed.      Skin integrity: Skin integrity normal. Callus present.      Toenail Condition: Right toenails are normal.      Left foot:      Protective Sensation: 10 sites tested.  10 sites sensed.      Skin integrity: Skin integrity normal. Callus present.      Toenail Condition: Left toenails are normal.      Comments: Diabetic Foot Exam Performed and Monofilament Test Performed    Skin:     General: Skin is warm.   Neurological:      General: No focal deficit present.      Mental Status: He is alert and oriented to person, place, and time.   Psychiatric:         Mood and Affect: Mood normal.         Behavior: Behavior normal.       Physical Exam  Throat appears normal.  Carotid arteries have a good sound.  Lungs produce better sounds.    Vital Signs  Blood pressure is 128/85.    PHQ-9 Total Score:    Results           Assessment & Plan   Diagnoses and all orders for this visit:    1. Encounter for annual general medical examination with abnormal findings in adult (Primary)    2. Vitamin D deficiency    3. Substance abuse    4. Secondary hypertension    5. Other male erectile dysfunction    6. Other hyperlipidemia    7. Nocturia    8. Chronic gout of foot, unspecified cause, unspecified laterality    9. Gastroesophageal reflux disease, unspecified whether esophagitis present    10. DM type 2, goal A1C to be determined    11. Class 1 obesity due to excess calories with serious  comorbidity and body mass index (BMI) of 31.0 to 31.9 in adult  Assessment & Plan:  Patient's (There is no height or weight on file to calculate BMI.) indicates that they are obese (BMI >30) with health conditions that include hypertension, diabetes mellitus, dyslipidemias, and GERD . Weight is unchanged. BMI  is above average; BMI management plan is completed. We discussed portion control and increasing exercise.       12. Esophageal dysphagia    13. Type 2 diabetes mellitus with diabetic autonomic neuropathy, without long-term current use of insulin    Other orders  -     gabapentin (NEURONTIN) 300 MG capsule; Take 1 capsule by mouth 3 (Three) Times a Day.  Dispense: 270 capsule; Refill: 1      Assessment & Plan  1. General medical examination.  He declined the hepatitis B vaccine.    2. Hypertension.  His blood pressure readings were within normal range today.    3. Hyperlipidemia.  He was advised to monitor his saturated fat intake.    4. Neuropathy.  A prescription for gabapentin 270 with 1 refill was provided.    Follow-up  A follow-up appointment is scheduled for 3 months from now.    Patient Instructions     Health Maintenance Due   Topic Date Due    DIABETIC EYE EXAM  Never done    Hepatitis B (1 of 3 - 19+ 3-dose series) Never done    TDAP/TD VACCINES (1 - Tdap) Never done    BMI FOLLOWUP  10/08/2022    COVID-19 Vaccine (5 - 2023-24 season) 09/01/2023    ANNUAL PHYSICAL  01/26/2024    DIABETIC FOOT EXAM  07/07/2024           Patient or patient representative verbalized consent for the use of Ambient Listening during the visit with  Ruth Owen MD for chart documentation. 7/12/2024  18:51 EDT

## 2024-07-14 RX ORDER — GLIMEPIRIDE 1 MG/1
1 TABLET ORAL
Qty: 90 TABLET | Refills: 0 | Status: SHIPPED | OUTPATIENT
Start: 2024-07-14

## 2024-08-12 RX ORDER — AMLODIPINE BESYLATE 10 MG/1
10 TABLET ORAL DAILY
Qty: 90 TABLET | Refills: 1 | Status: SHIPPED | OUTPATIENT
Start: 2024-08-12

## 2024-08-12 RX ORDER — ALLOPURINOL 100 MG/1
100 TABLET ORAL DAILY
Qty: 90 TABLET | Refills: 0 | Status: SHIPPED | OUTPATIENT
Start: 2024-08-12

## 2024-08-12 NOTE — TELEPHONE ENCOUNTER
Rx Refill Note  Requested Prescriptions     Pending Prescriptions Disp Refills   • amLODIPine (NORVASC) 10 MG tablet 90 tablet 1     Sig: Take 1 tablet by mouth Daily.   • allopurinol (ZYLOPRIM) 100 MG tablet 90 tablet 0     Sig: Take 1 tablet by mouth Daily.      Last office visit with prescribing clinician: 7/12/2024   Last telemedicine visit with prescribing clinician: Visit date not found   Next office visit with prescribing clinician: 11/1/2024       Would you like a call back once the refill request has been completed: [] Yes [] No    If the office needs to give you a call back, can they leave a voicemail: [] Yes [] No    Sharron Metzger MA  08/12/24, 11:39 EDT

## 2024-08-28 RX ORDER — ALLOPURINOL 100 MG/1
100 TABLET ORAL DAILY
Qty: 90 TABLET | Refills: 0 | Status: SHIPPED | OUTPATIENT
Start: 2024-08-28

## 2024-08-28 RX ORDER — GLIMEPIRIDE 1 MG/1
1 TABLET ORAL
Qty: 90 TABLET | Refills: 0 | Status: SHIPPED | OUTPATIENT
Start: 2024-08-28

## 2024-10-31 NOTE — PATIENT INSTRUCTIONS
Health Maintenance Due   Topic Date Due    DIABETIC EYE EXAM  Never done    Hepatitis B (1 of 3 - 19+ 3-dose series) Never done    TDAP/TD VACCINES (1 - Tdap) Never done    INFLUENZA VACCINE  08/01/2024    COVID-19 Vaccine (5 - 2023-24 season) 09/01/2024    HEMOGLOBIN A1C  12/18/2024

## 2024-11-01 ENCOUNTER — OFFICE VISIT (OUTPATIENT)
Dept: FAMILY MEDICINE CLINIC | Facility: CLINIC | Age: 54
End: 2024-11-01
Payer: COMMERCIAL

## 2024-11-01 ENCOUNTER — CLINICAL SUPPORT (OUTPATIENT)
Dept: FAMILY MEDICINE CLINIC | Facility: CLINIC | Age: 54
End: 2024-11-01
Payer: COMMERCIAL

## 2024-11-01 VITALS
DIASTOLIC BLOOD PRESSURE: 88 MMHG | WEIGHT: 236.6 LBS | BODY MASS INDEX: 32.05 KG/M2 | HEART RATE: 77 BPM | HEIGHT: 72 IN | SYSTOLIC BLOOD PRESSURE: 126 MMHG | TEMPERATURE: 98.4 F | OXYGEN SATURATION: 100 %

## 2024-11-01 DIAGNOSIS — K21.9 GASTROESOPHAGEAL REFLUX DISEASE, UNSPECIFIED WHETHER ESOPHAGITIS PRESENT: ICD-10-CM

## 2024-11-01 DIAGNOSIS — E11.9 DM TYPE 2, GOAL A1C TO BE DETERMINED: Primary | ICD-10-CM

## 2024-11-01 DIAGNOSIS — E11.9 DM TYPE 2, GOAL A1C TO BE DETERMINED: ICD-10-CM

## 2024-11-01 DIAGNOSIS — F19.10 SUBSTANCE ABUSE: ICD-10-CM

## 2024-11-01 DIAGNOSIS — R35.1 NOCTURIA: ICD-10-CM

## 2024-11-01 DIAGNOSIS — E78.49 OTHER HYPERLIPIDEMIA: ICD-10-CM

## 2024-11-01 DIAGNOSIS — I15.9 SECONDARY HYPERTENSION: ICD-10-CM

## 2024-11-01 DIAGNOSIS — M1A.0790 CHRONIC GOUT OF FOOT, UNSPECIFIED CAUSE, UNSPECIFIED LATERALITY: ICD-10-CM

## 2024-11-01 DIAGNOSIS — E66.09 CLASS 1 OBESITY DUE TO EXCESS CALORIES WITH SERIOUS COMORBIDITY AND BODY MASS INDEX (BMI) OF 32.0 TO 32.9 IN ADULT: ICD-10-CM

## 2024-11-01 DIAGNOSIS — E66.811 CLASS 1 OBESITY DUE TO EXCESS CALORIES WITH SERIOUS COMORBIDITY AND BODY MASS INDEX (BMI) OF 32.0 TO 32.9 IN ADULT: ICD-10-CM

## 2024-11-01 LAB
ALBUMIN UR-MCNC: <1.2 MG/DL
BASOPHILS # BLD AUTO: 0.06 10*3/MM3 (ref 0–0.2)
BASOPHILS NFR BLD AUTO: 0.6 % (ref 0–1.5)
BILIRUB UR QL STRIP: NEGATIVE
CLARITY UR: CLEAR
COLOR UR: YELLOW
CREAT UR-MCNC: 78.4 MG/DL
DEPRECATED RDW RBC AUTO: 41.1 FL (ref 37–54)
EOSINOPHIL # BLD AUTO: 0.34 10*3/MM3 (ref 0–0.4)
EOSINOPHIL NFR BLD AUTO: 3.6 % (ref 0.3–6.2)
ERYTHROCYTE [DISTWIDTH] IN BLOOD BY AUTOMATED COUNT: 12.9 % (ref 12.3–15.4)
GLUCOSE UR STRIP-MCNC: NEGATIVE MG/DL
HBA1C MFR BLD: 6 % (ref 4.8–5.6)
HCT VFR BLD AUTO: 49.4 % (ref 37.5–51)
HGB BLD-MCNC: 16.6 G/DL (ref 13–17.7)
HGB UR QL STRIP.AUTO: NEGATIVE
HOLD SPECIMEN: NORMAL
IMM GRANULOCYTES # BLD AUTO: 0.02 10*3/MM3 (ref 0–0.05)
IMM GRANULOCYTES NFR BLD AUTO: 0.2 % (ref 0–0.5)
KETONES UR QL STRIP: NEGATIVE
LEUKOCYTE ESTERASE UR QL STRIP.AUTO: NEGATIVE
LYMPHOCYTES # BLD AUTO: 2.36 10*3/MM3 (ref 0.7–3.1)
LYMPHOCYTES NFR BLD AUTO: 25.3 % (ref 19.6–45.3)
MCH RBC QN AUTO: 29.8 PG (ref 26.6–33)
MCHC RBC AUTO-ENTMCNC: 33.6 G/DL (ref 31.5–35.7)
MCV RBC AUTO: 88.7 FL (ref 79–97)
MICROALBUMIN/CREAT UR: NORMAL MG/G{CREAT}
MONOCYTES # BLD AUTO: 0.82 10*3/MM3 (ref 0.1–0.9)
MONOCYTES NFR BLD AUTO: 8.8 % (ref 5–12)
NEUTROPHILS NFR BLD AUTO: 5.72 10*3/MM3 (ref 1.7–7)
NEUTROPHILS NFR BLD AUTO: 61.5 % (ref 42.7–76)
NITRITE UR QL STRIP: NEGATIVE
NRBC BLD AUTO-RTO: 0 /100 WBC (ref 0–0.2)
PH UR STRIP.AUTO: 6 [PH] (ref 5–8)
PLATELET # BLD AUTO: 311 10*3/MM3 (ref 140–450)
PMV BLD AUTO: 10.6 FL (ref 6–12)
PROT UR QL STRIP: NEGATIVE
RBC # BLD AUTO: 5.57 10*6/MM3 (ref 4.14–5.8)
SP GR UR STRIP: 1.01 (ref 1–1.03)
T4 FREE SERPL-MCNC: 0.95 NG/DL (ref 0.93–1.7)
TSH SERPL DL<=0.05 MIU/L-ACNC: 5.17 UIU/ML (ref 0.27–4.2)
UROBILINOGEN UR QL STRIP: NORMAL
WBC NRBC COR # BLD AUTO: 9.32 10*3/MM3 (ref 3.4–10.8)

## 2024-11-01 PROCEDURE — 81003 URINALYSIS AUTO W/O SCOPE: CPT | Performed by: PREVENTIVE MEDICINE

## 2024-11-01 PROCEDURE — 36415 COLL VENOUS BLD VENIPUNCTURE: CPT

## 2024-11-01 PROCEDURE — 84439 ASSAY OF FREE THYROXINE: CPT | Performed by: PREVENTIVE MEDICINE

## 2024-11-01 PROCEDURE — 82607 VITAMIN B-12: CPT | Performed by: PREVENTIVE MEDICINE

## 2024-11-01 PROCEDURE — 80061 LIPID PANEL: CPT | Performed by: PREVENTIVE MEDICINE

## 2024-11-01 PROCEDURE — 83735 ASSAY OF MAGNESIUM: CPT | Performed by: PREVENTIVE MEDICINE

## 2024-11-01 PROCEDURE — 36415 COLL VENOUS BLD VENIPUNCTURE: CPT | Performed by: PREVENTIVE MEDICINE

## 2024-11-01 PROCEDURE — 82570 ASSAY OF URINE CREATININE: CPT | Performed by: PREVENTIVE MEDICINE

## 2024-11-01 PROCEDURE — 84550 ASSAY OF BLOOD/URIC ACID: CPT | Performed by: PREVENTIVE MEDICINE

## 2024-11-01 PROCEDURE — 82043 UR ALBUMIN QUANTITATIVE: CPT | Performed by: PREVENTIVE MEDICINE

## 2024-11-01 PROCEDURE — 80050 GENERAL HEALTH PANEL: CPT | Performed by: PREVENTIVE MEDICINE

## 2024-11-01 PROCEDURE — 83036 HEMOGLOBIN GLYCOSYLATED A1C: CPT | Performed by: PREVENTIVE MEDICINE

## 2024-11-01 RX ORDER — SEMAGLUTIDE 1.34 MG/ML
0.25 INJECTION, SOLUTION SUBCUTANEOUS WEEKLY
Qty: 1.5 ML | Refills: 0 | Status: SHIPPED | OUTPATIENT
Start: 2024-11-01

## 2024-11-01 NOTE — PROGRESS NOTES
Venipuncture performed on Left Arm by Ruth Owen MD  with good hemostasis. Patient tolerated well. 11/15/24  Rtuh Owen MD

## 2024-11-02 LAB
ALBUMIN SERPL-MCNC: 4.4 G/DL (ref 3.5–5.2)
ALBUMIN/GLOB SERPL: 1.1 G/DL
ALP SERPL-CCNC: 126 U/L (ref 39–117)
ALT SERPL W P-5'-P-CCNC: 28 U/L (ref 1–41)
ANION GAP SERPL CALCULATED.3IONS-SCNC: 13.2 MMOL/L (ref 5–15)
AST SERPL-CCNC: 27 U/L (ref 1–40)
BILIRUB SERPL-MCNC: 0.3 MG/DL (ref 0–1.2)
BUN SERPL-MCNC: 14 MG/DL (ref 6–20)
BUN/CREAT SERPL: 13.1 (ref 7–25)
CALCIUM SPEC-SCNC: 10.3 MG/DL (ref 8.6–10.5)
CHLORIDE SERPL-SCNC: 102 MMOL/L (ref 98–107)
CHOLEST SERPL-MCNC: 209 MG/DL (ref 0–200)
CO2 SERPL-SCNC: 24.8 MMOL/L (ref 22–29)
CREAT SERPL-MCNC: 1.07 MG/DL (ref 0.76–1.27)
EGFRCR SERPLBLD CKD-EPI 2021: 81.4 ML/MIN/1.73
GLOBULIN UR ELPH-MCNC: 3.9 GM/DL
GLUCOSE SERPL-MCNC: 79 MG/DL (ref 65–99)
HDLC SERPL-MCNC: 46 MG/DL (ref 40–60)
LDLC SERPL CALC-MCNC: 146 MG/DL (ref 0–100)
LDLC/HDLC SERPL: 3.12 {RATIO}
MAGNESIUM SERPL-MCNC: 2.2 MG/DL (ref 1.6–2.6)
POTASSIUM SERPL-SCNC: 4.4 MMOL/L (ref 3.5–5.2)
PROT SERPL-MCNC: 8.3 G/DL (ref 6–8.5)
SODIUM SERPL-SCNC: 140 MMOL/L (ref 136–145)
TRIGL SERPL-MCNC: 97 MG/DL (ref 0–150)
URATE SERPL-MCNC: 4.8 MG/DL (ref 3.4–7)
VIT B12 BLD-MCNC: 608 PG/ML (ref 211–946)
VLDLC SERPL-MCNC: 17 MG/DL (ref 5–40)

## 2024-11-02 NOTE — PROGRESS NOTES
Brenda Zelaya is a 54 y.o. male presents for   Chief Complaint   Patient presents with    6 month checkup     Fasting, 6 month checkup.       Health Maintenance Due   Topic Date Due    DIABETIC EYE EXAM  Never done    Hepatitis B (1 of 3 - 19+ 3-dose series) Never done    TDAP/TD VACCINES (1 - Tdap) Never done    INFLUENZA VACCINE  08/01/2024    COVID-19 Vaccine (5 - 2023-24 season) 09/01/2024       History of Present Illness   History of Present Illness  The patient is a 54-year-old male here today for a follow-up on type 2 diabetes, class 1 obesity due to excess calories with serious comorbidities, substance abuse, secondary hypertension, hyperlipidemia, nocturia, chronic gout of the foot, and gastroesophageal reflux disease (GERD).    He reports no changes in his hearing or vision since his last visit. He has seen both a dentist and an eye doctor within the past year. He does not regularly monitor his blood sugar levels, only doing so when he feels unwell. He is currently on glimepiride for his diabetes. He attempted to start Ozempic, but his insurance denied coverage. He is interested in trying glimepiride again. He has been taking half a pill for the past 6 months and still experiences low blood sugar occasionally. He typically does not eat until later in the day, often having a couple of boiled eggs in the morning. He reports no issues with bowel movements or urination and has not noticed any blood in his stool or urine. He has not experienced any fever, chills, or night sweats. He has not lost weight, but notes that he was wearing more clothes during his last weigh-in. He has lost 30 pounds since his last visit and has maintained this weight loss. He avoids fried foods and sweets, and consumes a diet rich in eggs, cheese, and chicken wraps. He believes his cholesterol may be slightly elevated due to his diet. His last blood work showed improvements in all areas. He is fasting today.    He reports  "that his nocturia has improved since starting DDAVP. He has not experienced any gout attacks. He denies experiencing any swallowing difficulties or indigestion. He reports no issues with sexual function. He denies coughing up blood, experiencing chest pressure, or heart palpitations.    ALLERGIES  He denies any allergies to medicines.    IMMUNIZATIONS  He got his COVID-19 shot about a week and a half ago. He will get his influenza shot next weekend. He is up to date on his pneumonia and shingles vaccines.    Vitals:    11/01/24 1240 11/01/24 1242   BP: 124/83 126/88   BP Location: Left arm Right arm   Patient Position: Sitting Sitting   Cuff Size: Large Adult Large Adult   Pulse: 71 77   Temp: 98.4 °F (36.9 °C)    SpO2: 97% 100%   Weight: 107 kg (236 lb 9.6 oz)    Height: 182.9 cm (72.01\")      Body mass index is 32.08 kg/m².    Current Outpatient Medications on File Prior to Visit   Medication Sig Dispense Refill    allopurinol (ZYLOPRIM) 100 MG tablet Take 1 tablet by mouth once daily 90 tablet 0    amLODIPine (NORVASC) 10 MG tablet Take 1 tablet by mouth Daily. 90 tablet 1    aspirin 81 MG oral suspension Take 81 mL by mouth 1 (One) Time.      desmopressin (DDAVP) 0.2 MG tablet Take  by mouth every night at bedtime.      gabapentin (NEURONTIN) 300 MG capsule Take 1 capsule by mouth 3 (Three) Times a Day. 270 capsule 1    glimepiride (AMARYL) 1 MG tablet TAKE 1 TABLET BY MOUTH ONCE DAILY IN THE MORNING BEFORE BREAKFAST 90 tablet 0    multivitamin (THERAGRAN) tablet tablet       omeprazole (priLOSEC) 20 MG capsule Take 1 capsule by mouth Daily. 90 capsule 3    pravastatin (Pravachol) 40 MG tablet Take 1 tablet by mouth Daily. 90 tablet 3    diphenhydrAMINE HCl, Sleep, (Sleep Aid) 50 MG capsule  (Patient not taking: Reported on 11/1/2024)       No current facility-administered medications on file prior to visit.       The following portions of the patient's history were reviewed and updated as appropriate: " allergies, current medications, past family history, past medical history, past social history, past surgical history, and problem list.    Review of Systems   Gastrointestinal:  Negative for GERD.   Genitourinary:  Positive for nocturia.   Musculoskeletal:  Negative for arthralgias.       Objective   Physical Exam  Vitals reviewed.   Constitutional:       General: He is not in acute distress.     Appearance: Normal appearance. He is well-developed. He is not ill-appearing or toxic-appearing.   HENT:      Head: Normocephalic and atraumatic.      Right Ear: Tympanic membrane, ear canal and external ear normal.      Left Ear: Tympanic membrane, ear canal and external ear normal.      Nose: Nose normal.      Mouth/Throat:      Mouth: Mucous membranes are moist.      Pharynx: No posterior oropharyngeal erythema.   Eyes:      Extraocular Movements: Extraocular movements intact.      Conjunctiva/sclera: Conjunctivae normal.      Pupils: Pupils are equal, round, and reactive to light.   Neck:      Vascular: No carotid bruit.   Cardiovascular:      Rate and Rhythm: Normal rate and regular rhythm.      Pulses:           Dorsalis pedis pulses are 1+ on the right side and 1+ on the left side.        Posterior tibial pulses are 1+ on the right side and 1+ on the left side.      Heart sounds: Normal heart sounds.   Pulmonary:      Effort: Pulmonary effort is normal.      Breath sounds: Normal breath sounds.   Abdominal:      General: Bowel sounds are normal. There is no distension.      Palpations: Abdomen is soft. There is no mass.      Tenderness: There is no abdominal tenderness. There is no right CVA tenderness or left CVA tenderness.   Musculoskeletal:         General: Normal range of motion.      Cervical back: Neck supple. No tenderness.      Right lower leg: No edema.      Left lower leg: No edema.   Feet:      Right foot:      Protective Sensation: 10 sites tested.  10 sites sensed.      Skin integrity: Skin integrity  normal. Callus and dry skin present.      Toenail Condition: Right toenails are normal.      Left foot:      Protective Sensation: 10 sites tested.  10 sites sensed.      Skin integrity: Skin integrity normal. Callus present.      Toenail Condition: Left toenails are abnormally thick.      Comments:     Lymphadenopathy:      Cervical: No cervical adenopathy.   Skin:     General: Skin is warm.   Neurological:      General: No focal deficit present.      Mental Status: He is alert and oriented to person, place, and time.   Psychiatric:         Mood and Affect: Mood normal.         Behavior: Behavior normal.       Physical Exam  Ears and throat appear normal.  Carotid arteries sound normal.  Heart exhibits a normal rate and rhythm.    Vital Signs  Blood pressure readings are 124/83 and 126/88.    PHQ-9 Total Score:    Results           Assessment & Plan   Diagnoses and all orders for this visit:    1. DM type 2, goal A1C to be determined (Primary)  -     Hemoglobin A1c; Future  -     Microalbumin / Creatinine Urine Ratio - Urine, Clean Catch; Future  -     TSH Rfx On Abnormal To Free T4; Future  -     Vitamin B12; Future    2. Substance abuse    3. Secondary hypertension  -     CBC Auto Differential; Future  -     Comprehensive Metabolic Panel; Future    4. Other hyperlipidemia  -     Lipid Panel; Future    5. Nocturia  -     Urinalysis With Culture If Indicated - Urine, Clean Catch; Future    6. Chronic gout of foot, unspecified cause, unspecified laterality  -     Uric Acid; Future    7. Gastroesophageal reflux disease, unspecified whether esophagitis present  -     Magnesium; Future    8. Class 1 obesity due to excess calories with serious comorbidity and body mass index (BMI) of 32.0 to 32.9 in adult    Other orders  -     Semaglutide,0.25 or 0.5MG/DOS, (Ozempic, 0.25 or 0.5 MG/DOSE,) 2 MG/1.5ML solution pen-injector; Inject 0.25 mg under the skin into the appropriate area as directed 1 (One) Time Per Week. For  four weeks  Dispense: 1.5 mL; Refill: 0      Assessment & Plan  1. Type 2 Diabetes Mellitus.  He does not regularly check his blood sugar levels. He is currently on glimepiride and has been taking half a tablet to avoid low blood sugar episodes. He is advised to take glimepiride at night and always carry a sugar source. A prescription for Ozempic will be provided, and he is advised to shop around for availability. If approved, he may be able to discontinue glimepiride.   Labs will be drawn today to monitor his diabetes.    2. Class I Obesity.  He has lost 30 pounds but has plateaued. He is advised to continue his current diet, which includes avoiding fried foods and sweets. If Ozempic is approved, it may help with further weight loss.    3. Secondary Hypertension.  His blood pressure readings are 124/83 and 126/88. He is advised to aim for a lower diastolic number due to his diabetes. No changes in medication are recommended at this time.    4. Hyperlipidemia.  He is aware that his cholesterol might be high due to his diet. Cholesterol levels will be checked with today's labs.    5. Nocturia.  He reports improvement with his current medication, DDAVP. He is advised to continue taking it as prescribed.    6. Gastroesophageal Reflux Disease (GERD).  He reports no current issues with heartburn. No changes in treatment are recommended.    7. Chronic Gout of the Foot.  He reports no recent gout attacks. No changes in treatment are recommended.    8. Substance Abuse.  He is not currently on any substances. No further action is required at this time.    9. Health Maintenance.  He received his Covid shot about a week and a half ago and plans to get his flu shot next weekend. His pneumonia and shingles vaccines are up to date. He has seen the dentist and eye doctor within the last year.    Follow-up  Patient will return in 3 months for follow up.    Patient Instructions     Health Maintenance Due   Topic Date Due    DIABETIC  EYE EXAM  Never done    Hepatitis B (1 of 3 - 19+ 3-dose series) Never done    TDAP/TD VACCINES (1 - Tdap) Never done    INFLUENZA VACCINE  08/01/2024    COVID-19 Vaccine (5 - 2023-24 season) 09/01/2024    HEMOGLOBIN A1C  12/18/2024           Patient or patient representative verbalized consent for the use of Ambient Listening during the visit with  Ruth Owen MD for chart documentation. 11/2/2024  07:42 EDT

## 2024-11-02 NOTE — PROGRESS NOTES
Mary Beth José MD  [facility]  Re: Irina Alcantara    I met with Ms. Irina Alcantara today to discuss multiple comorbidities in pregnancy. She is now 22 4/7 weeks gestation by 7 /7-week ultrasound with KASANDRA 2024. She is . Her first pregnancy was complicated by gestational hypertension, and she was delivered by  delivery at term. This is her third pregnancy as she previously had one spontaneous loss. She reports multiple problems. Most significant is her lightheadedness and palpitations which interfere with activities of daily living. She reports that at approximately 16 weeks she was referred to ER for evaluation of the same symptoms. She was diagnosed with dehydration, was rehydrated, discharged to home and felt better after receiving IV fluids. She has since then been aware of maintaining adequate fluid intake but continues to feel the same symptoms intermittently. She did have normal troponins and a normal EKG at her initial assessment. The symptoms are interfering with her activities of daily living including her ability to remain active in pregnancy and care for her 4-year-old child. She has not had any specific work up for this problem.    In addition to the above, she reports severe bowel constipation with bowel movements 1-2 a week. She reports having tried stool softeners unsuccessfully. Her diet is very high carbohydrate content and per her report, her go to food is at [employer].  She also reports that she discontinued smoking in the first trimester and has not required her asthma medication during this pregnancy.     I have asked her about sleep apnea and both the patient and partner report no episodes of sleep apnea although there is some light snoring. The patient does have a history of depression and anxiety and takes Unisom for sleep induction and Venlafaxine for depression and anxiety. She has considered tapering down her medication prior to delivery.  Glucose was was 79 and A1c shows good control at 6.  TSH was elevated but the active ingredient T4 was normal.  Total and bad cholesterol are both elevated with diet and exercise in order to decrease your risk for heart attack stroke and peripheral vascular disease?  Can we increase your pravastatin to 80?  Please let us know     Follow up anatomy scan today reports mild polyhydramnios with ERON of 24.6 cm.     /81 (BP Location: Right arm, Patient Position: Sitting, Cuff Size: Adult Large)   Pulse (!) 134   LMP 06/15/2023 (Approximate)   SpO2 97%       Lab Results   Component Value Date    WBC 10.6 2011    HGB 13.9 2011    HCT 41.0 2011     2011     2011    POTASSIUM 4.5 2011    CHLORIDE 107 2011    CO2 25 2011    BUN 11 2011    CR 0.77 2011    GLC 72 2011    AST 28 2011    ALT 28 2011    ALKPHOS 115 2011    BILITOTAL 0.4 2011        Impression and Plan:    BMI 44 kg/m2: Ms. Alcantara does not have a diagnosis of TANIA. Formal recommendation for assessment of sleep apnea if clinical concerns arise including poor sleep pattern, apnea or snoring.  Regarding obstructive sleep apnea, this is a serious condition which is tied to metabolic syndrome and its associated conditions, is common in obstetric populations with an incidence of 11-20%.  Many women with TANIA are obese.  Pregnant women with a BMI?30 are 13 times more likely to be diagnosed with obstructive sleep apnea than their normal weight counterparts.  Although the risks of TANIA are often associated with health problems later in life, multiple studies have shown an increased risk for severe  morbidity and mortality, both maternal and fetal/.  These risks include preeclampsia, gestational diabetes, post-operative wound complications, acute renal failure, pulmonary edema, pulmonary emboli, peripartum cardiomyopathy, stroke, and in hospital death, and a trend toward fetal growth restriction.  It has been recommended in the literature to screen all women with BMI ?30 for symptoms of sleep apnea.  Different screening tools have been suggested for this use including the STOP questions, [place] questionnaire, [place] Sleepiness Scale (ESS), as well as a customized risk  score specific to pregnancy.  The STOP questions, [place] questionnaire, and ESS have not been verified in pregnant women, but the [place] questionnaire and ESS have been shown to have a poor correlation in a pregnant population with the presence of TANIA.    The high odds ratios associated with TANIA in pregnancy for poor outcomes suggests that screening for TANIA should be performed in all women with BMI ? 35 with clinical concerns, as well as women with BMI > 35 and chronic hypertension.  Those women who screen positive should be referred for polysomnography with sleep medicine as early as possible to allow for treatment while it may still improve outcomes.    Anticoagulation in the antepartum and postpartum periods for obese women    Venous thromboembolism in pregnancy is responsible for up to 9-10% of maternal deaths in developed countries, with many of these deaths (>90%) having an element of preventability.  Obesity is certainly a risk factor for DVT throughout pregnancy, increasing risk between 2.5-3 times.      Throughout pregnancy, risk for VTE is highest during times of immobility, such as surgery or trauma, and antepartum admission.  Risk is highest after delivery and remains high in the 6 weeks postpartum.  Upon admission to the hospital during pregnancy for non-delivery indications, women should undergo evaluation for risk VTE within 24 hours.  Antepartum admission during pregnancy confers an 18-fold risk of VTE over baseline in otherwise low risk women, which approaches the risk of women with a high-risk thrombophilia or prior VTE.  Due to this increased risk, VTE prophylaxis is warranted.  For women with BMI > 30 kg/m2, and for whom admission of at least 72 hours is anticipated, mechanical prophylaxis while in bed along with maintenance of mobility is encouraged.  Pharmacologic prophylaxis is also recommended in this population.    Data suggest that risk of maternal death from VTE is highest in women  undergoing  delivery with BMI > 30 kg/m2, with more than half of these women having a BMI ?35.  In the absence of a prior history of DVT/PE, high risk thrombophilia, or other indication for chronic anticoagulation, prophylactic anticoagulation should be instituted within 24 hours of delivery and throughout the hospitalization. Vaginal delivery is less associated with VTE than  delivery.  Given the significantly lower risk for thromboembolic event following vaginal delivery, a threshold of BMI ? 40 is recommended to consider thromboprophylaxis during admission for delivery.       Timing of Delivery in women with BMI > 40 kg/m2    Pregnancy complications are increased with BMI > 40 kg/m2, even in the absence of other overt comorbid conditions.  These risks include, but are not limited to, gestational diabetes, gestational hypertension, preeclampsia,  delivery, miscarriage, congenital anomaly, and stillbirth.  The risk of stillbirth appears to increase with increasing BMI.  Furthermore, the risk of stillbirth increases with increasing week of gestational age. Both a retrospective cohort study and decision analysis have shown overall benefit to elective term induction of labor at 39 0/7 and 39 6/7 weeks for women with a BMI ? 40. Due to CHTN, recommend delivery between 38 0/7 and 39 6/7 weeks.      Testing     Maternal with BMI > 30 kg/m2 is an established risk factor for stillbirth.  The reported risk of stillbirth is 2-5 times higher with BMI > 30 kg/m2, compared with women with BMI 19-24 kg/m2. Epidemiologic studies have demonstrated a strong dose-dependent association between BMI and stillbirth.  The largest of these studies found that, among women with BMI > 40 kg/m2, the risk of stillbirth is 1.5 times higher compared with women with BMI < 30 kg/m2. These findings remained consistent after adjustment for known confounding risk factors such as chronic hypertension and pregestational  diabetes. This study shows that nearly 20% of all stillbirths in this population were associated with BMI > 30 kg/m2, and at term- after 37 weeks' gestation- nearly 1 in 4 stillbirths were associated with BMI > 30 kg/m2.  They found that fetuses who are at risk for stillbirth crosses the  death rate earlier as BMI increases. Recommendation is for weekly  testing at 36 weeks until delivery for women with BMI > 40 kg/m2.       Additional recommendations: The IOM recommendation for weight gain is 5-9.1 kg (11-20 lb) for all women with BMI =/> 30 kg/m2. After these recommendations subsequent studies have recommended more stringent parameters with some recommending no weight gain in pregnancy to improve short- and long-term outcomes. I have discussed limiting weight gain to 0-10 pounds during pregnancy if possible. This may require meeting with a nutritionist to provide dietary advice.    Lifestyle changes. I have recommended mild to moderate physical activity 3-5 times a week and maintaining weight gain to less than 5 kg for the remainder of pregnancy. These interventions can improve outcomes for mother and baby by helping with weight management and decreasing the progression of hypertensive complications during pregnancy. We discussed risk for TANIA. Patient reports that she has been screened in past but has been asymptomatic. Ms. Alcantara is unable to be active and has difficulties caring for her 4-year-old son due to episodes of palpitations and light headedness.     DEPRESSION AND ANXIETY: Patient is seeing a counselor on a regular weekly bases and takes Venlafaxine for depression and anxiety and Unisom for sleep induction. She feels safe and has a supportive partner. She did mention wanting to wean off her medications prior to delivery. I have discouraged this plan as pregnancy outcomes are improved when mood disorders are appropriately managed and stable in pregnancy. Furthermore, given her  depression, she would be at risk for worsening depression after delivery. She agrees and understands this plan.    CHTN:   We have discussed the effects of CHTN on pregnancy, the effects of pregnancy on CHTN and have made the following recommendations based on risk for development of preeclampsia on CHTN, risk for fetal growth restriction; risks of poorly controlled hypertension and need for  surveillance.    Evaluation of severity of disease is based on clinical and laboratory findings. Clinically severity is based on neurological, respiratory, cardiovascular, renal and abdominal findings. Recommend obtaining a repeat maternal ECHO and an EKG to evaluate for evidence of LV hypertrophy secondary to CHTN. The laboratory assessment that was performed at baseline should be repeated at time of development of any new symptoms or signs of disease. The orders for the ECHO and EKG have been placed.       Surveillance is performed both on maternal evolution of disease with development of new symptoms or worsening of current symptoms.  We reviewed common symptoms including headache, visual changes and epigastric or RUQ pain.     Management of blood pressure should include regular blood pressure measurements with values within current range of 120-130 to 80-90 mmHg. The target range for pregnancy of is below 140/90 mmHg. Future need for higher dose or additional BP medications can be determined depending on BP. The normal progression of blood pressure changes with pregnancy shows a rise in the third trimester to pre-pregnancy values or higher. If BP additional medication is required for BP ?160/110 mmHg after reaching maximum dose of labetalol (2400 mg a day), consider adding nifedipine or HCTZ (unless preeclampsia is diagnosed).    Life style changes. I have recommended mild to moderate physical activity for at least 30 minutes 5 times a week and maintaining weight gain between 0 and 5 kg for the entire pregnancy.  These interventions have been shown to improve outcomes for mother and baby by helping with weight management and decreasing the progression of hypertensive complications during pregnancy. This would also decrease risk for developing symptoms secondary to pseudotumor cerebri. We have discussed the difficulty of establishing regular physical activity with current symptoms. For this reason, I have contacted and requested consultation with cardiology. Tests for consult have been requested. Patient may need holter or ZioPatch to determine frequency of tachycardia.     Recommend continuing LDA 81 mg twice a day which has been shown to decrease risk for progression to superimposed preeclampsia. We discussed increased risk secondary to CHTN.    Recommend growth scans every 4 weeks secondary to CHTN. Recommend initiating antepartum fetal surveillance beginning at 32 weeks with weekly BPP if medication is required for BP management. If no BP medication is prescribed, can start weekly BPP at 34 weeks for maternal BMI 44 kg/m2.    Recommendations for delivery include delivery by 38 0/7 to 39 6/7 for CHTN. If patient develops superimposed preeclampsia, delivery may occur even earlier depending on severity of disease and according to standard protocols. Preeclampsia without severe features is indication for delivery by 36-37 weeks and with severe features no later than 34 weeks.    Recommend home blood pressure monitoring with a large cuff.    SMOKING AND ASTHMA: Patient has discontinued smoking this pregnancy. I have encouraged ongoing support of smoking cessation which has also decreased need for asthma medications.     MATERNAL TACHYCARDIA:  I have discussed my concerns for underlying pathology leading to persistently recurring maternal tachycardia. In consult today maternal hear rate was 130-135 bpm. This is compounded by her inability to care for her 4-year-old and perform activities of daily living. I have recommended  cardiology consult which we will coordinate as well as pre-consultation testing including maternal ECHO, maternal TSH and free T4, CMP and magnesium.     Summary:   Recommend early and standard 1-hour OGCT. Early screen was normal. Follow up screen is pending and I have discussed the importance of the scree secondary to mild polyhydramnios detected today.  Weight gain 0-10 lb. for the remainder of pregnancy.   Moderate physical activity 150 minutes per week. This includes swimming, dancing and walking. Encourage activities attractive to patient.   Monitor polyhydramnios with ERON every 2 weeks. If moderate to severe polyhydramnios develops, refer to MFM.  Fetal growth scans at every 4 weeks beginning at 28 weeks.   Weekly BPP beginning at 34 weeks.    Delivery by 38 0/7 to 39 6/7 weeks for maternal with BMI > 40 kg/m2 and CHTN no medications.   Recommend anesthesia consult prior to delivery in third trimester.   Ensure OR is equipped for care of people with higher weight.   If GDM diagnosed, delivery depending on blood sugar control. If well controlled, delivery by 39 weeks, if poorly controlled, delivery as early as 36 weeks.   Continue treatment for depression and anxiety, to prevent severe symptoms associated with this condition especially after delivery. Continue mood disorder medication in postpartum  Monitor for development of preeclampsia  Recommend use of LDA at two 81 mg tablets a day throughout pregnancy.   Cardiology consult with tests requested above: maternal ECHO, maternal TSH and free T4, CMP and magnesium.          The patient had all her questions answered. She voiced understanding of the plan of care and her satisfaction with our care today. Thank you for the opportunity to participate in the care of Ms. Samuel. Please do not hesitate to contact us if you may have any questions or concerns.       I spent 15 minutes prior to the visit preparing to see the patient (reviewing medical records and  tests).       I spent 40 minutes face-face-to-face with the patient during the visit with the majority (>50%) spent on counseling and coordination of care with the patient and/or family members.      I spent 20 minutes after the visit with the patient documenting the visit in the electronic health record and/or communicating with other health care professionals and/or care coordination.       Total time spent on today's date of service: 75 minutes.     Elvis Braden M.D.  Maternal Fetal Medicine  12/04/23

## 2024-11-04 ENCOUNTER — TELEPHONE (OUTPATIENT)
Dept: FAMILY MEDICINE CLINIC | Facility: CLINIC | Age: 54
End: 2024-11-04
Payer: COMMERCIAL

## 2024-11-04 RX ORDER — PRAVASTATIN SODIUM 80 MG/1
80 TABLET ORAL DAILY
Qty: 90 TABLET | Refills: 3 | Status: SHIPPED | OUTPATIENT
Start: 2024-11-04

## 2024-11-04 NOTE — TELEPHONE ENCOUNTER
HUB TO RELAY:  ----- Message from Ruth Owen sent at 11/2/2024  8:41 AM EDT -----  Glucose was was 79 and A1c shows good control at 6.  TSH was elevated but the active ingredient T4 was normal.  Total and bad cholesterol are both elevated with diet and exercise in order to decrease your risk for heart attack stroke and peripheral vascular disease?  Can we increase your pravastatin to 80?  Please let us know

## 2024-11-05 ENCOUNTER — PRIOR AUTHORIZATION (OUTPATIENT)
Dept: FAMILY MEDICINE CLINIC | Facility: CLINIC | Age: 54
End: 2024-11-05
Payer: COMMERCIAL

## 2024-11-06 ENCOUNTER — PATIENT MESSAGE (OUTPATIENT)
Dept: FAMILY MEDICINE CLINIC | Facility: CLINIC | Age: 54
End: 2024-11-06
Payer: COMMERCIAL

## 2024-11-06 RX ORDER — SEMAGLUTIDE 0.68 MG/ML
0.25 INJECTION, SOLUTION SUBCUTANEOUS WEEKLY
Qty: 3 ML | Refills: 0 | Status: SHIPPED | OUTPATIENT
Start: 2024-11-06

## 2024-12-09 ENCOUNTER — PATIENT MESSAGE (OUTPATIENT)
Dept: FAMILY MEDICINE CLINIC | Facility: CLINIC | Age: 54
End: 2024-12-09
Payer: COMMERCIAL

## 2024-12-10 RX ORDER — SEMAGLUTIDE 1.34 MG/ML
0.25 INJECTION, SOLUTION SUBCUTANEOUS WEEKLY
Qty: 1.5 ML | Refills: 1 | Status: SHIPPED | OUTPATIENT
Start: 2024-12-10 | End: 2024-12-12

## 2024-12-12 RX ORDER — SEMAGLUTIDE 0.68 MG/ML
0.5 INJECTION, SOLUTION SUBCUTANEOUS WEEKLY
Qty: 3 ML | Refills: 1 | Status: SHIPPED | OUTPATIENT
Start: 2024-12-12

## 2024-12-17 ENCOUNTER — TELEPHONE (OUTPATIENT)
Dept: FAMILY MEDICINE CLINIC | Facility: CLINIC | Age: 54
End: 2024-12-17
Payer: COMMERCIAL

## 2024-12-17 RX ORDER — SEMAGLUTIDE 1.34 MG/ML
1 INJECTION, SOLUTION SUBCUTANEOUS
Qty: 3 ML | Refills: 0 | Status: SHIPPED | OUTPATIENT
Start: 2024-12-17

## 2024-12-17 NOTE — TELEPHONE ENCOUNTER
I have pended this per pharmacy  The 0.25 or 0.5 mg dose has been discontinued NDC    Needs to e ozempic 2mg/3ml  Ndc [id number]  Please review I am unsure if I placed this correctly.

## 2024-12-23 ENCOUNTER — TELEPHONE (OUTPATIENT)
Dept: FAMILY MEDICINE CLINIC | Facility: CLINIC | Age: 54
End: 2024-12-23
Payer: COMMERCIAL

## 2024-12-23 DIAGNOSIS — Z13.6 ENCOUNTER FOR SCREENING FOR VASCULAR DISEASE: Primary | ICD-10-CM

## 2025-01-16 ENCOUNTER — ON CAMPUS - OUTPATIENT (AMBULATORY)
Dept: URBAN - METROPOLITAN AREA HOSPITAL 85 | Facility: HOSPITAL | Age: 55
End: 2025-01-16
Payer: COMMERCIAL

## 2025-01-16 ENCOUNTER — HOSPITAL ENCOUNTER (EMERGENCY)
Facility: HOSPITAL | Age: 55
Discharge: HOME | End: 2025-01-16
Attending: EMERGENCY MEDICINE
Payer: COMMERCIAL

## 2025-01-16 ENCOUNTER — ANESTHESIA (OUTPATIENT)
Dept: GASTROENTEROLOGY | Facility: HOSPITAL | Age: 55
End: 2025-01-16
Payer: COMMERCIAL

## 2025-01-16 ENCOUNTER — ANESTHESIA EVENT (OUTPATIENT)
Dept: GASTROENTEROLOGY | Facility: HOSPITAL | Age: 55
End: 2025-01-16
Payer: COMMERCIAL

## 2025-01-16 VITALS
HEART RATE: 77 BPM | BODY MASS INDEX: 31.95 KG/M2 | RESPIRATION RATE: 10 BRPM | OXYGEN SATURATION: 93 % | DIASTOLIC BLOOD PRESSURE: 85 MMHG | TEMPERATURE: 98.2 F | HEIGHT: 72 IN | SYSTOLIC BLOOD PRESSURE: 119 MMHG | WEIGHT: 235.89 LBS

## 2025-01-16 DIAGNOSIS — T18.128A ESOPHAGEAL OBSTRUCTION DUE TO FOOD IMPACTION: ICD-10-CM

## 2025-01-16 DIAGNOSIS — T18.128A FOOD IN ESOPHAGUS CAUSING OTHER INJURY, INITIAL ENCOUNTER: ICD-10-CM

## 2025-01-16 DIAGNOSIS — R13.10 DYSPHAGIA, UNSPECIFIED: ICD-10-CM

## 2025-01-16 DIAGNOSIS — K31.89 OTHER DISEASES OF STOMACH AND DUODENUM: ICD-10-CM

## 2025-01-16 DIAGNOSIS — K22.2 ESOPHAGEAL OBSTRUCTION: ICD-10-CM
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
## 2025-01-16 PROCEDURE — 25010000002 SUCCINYLCHOLINE PER 20 MG: Performed by: NURSE ANESTHETIST, CERTIFIED REGISTERED

## 2025-01-16 PROCEDURE — 99285 EMERGENCY DEPT VISIT HI MDM: CPT

## 2025-01-16 PROCEDURE — 25010000002 DEXAMETHASONE SODIUM PHOSPHATE 20 MG/5ML SOLUTION: Performed by: NURSE ANESTHETIST, CERTIFIED REGISTERED

## 2025-01-16 PROCEDURE — 25810000003 LACTATED RINGERS PER 1000 ML: Performed by: NURSE ANESTHETIST, CERTIFIED REGISTERED

## 2025-01-16 PROCEDURE — 43247 EGD REMOVE FOREIGN BODY: CPT | Performed by: INTERNAL MEDICINE

## 2025-01-16 PROCEDURE — 25010000002 ONDANSETRON PER 1 MG: Performed by: NURSE ANESTHETIST, CERTIFIED REGISTERED

## 2025-01-16 PROCEDURE — 43239 EGD BIOPSY SINGLE/MULTIPLE: CPT | Mod: 59 | Performed by: INTERNAL MEDICINE

## 2025-01-16 PROCEDURE — 25010000002 PROPOFOL 200 MG/20ML EMULSION: Performed by: NURSE ANESTHETIST, CERTIFIED REGISTERED

## 2025-01-16 PROCEDURE — 88305 TISSUE EXAM BY PATHOLOGIST: CPT | Performed by: INTERNAL MEDICINE

## 2025-01-16 PROCEDURE — 25010000002 GLUCAGON (RDNA) PER 1 MG: Performed by: EMERGENCY MEDICINE

## 2025-01-16 PROCEDURE — 96372 THER/PROPH/DIAG INJ SC/IM: CPT

## 2025-01-16 PROCEDURE — 25010000002 LIDOCAINE PF 1% 1 % SOLUTION: Performed by: NURSE ANESTHETIST, CERTIFIED REGISTERED

## 2025-01-16 RX ORDER — ONDANSETRON 2 MG/ML
INJECTION INTRAMUSCULAR; INTRAVENOUS AS NEEDED
Status: DISCONTINUED | OUTPATIENT
Start: 2025-01-16 | End: 2025-01-16 | Stop reason: SURG

## 2025-01-16 RX ORDER — SUCCINYLCHOLINE CHLORIDE 20 MG/ML
INJECTION INTRAMUSCULAR; INTRAVENOUS AS NEEDED
Status: DISCONTINUED | OUTPATIENT
Start: 2025-01-16 | End: 2025-01-16 | Stop reason: SURG

## 2025-01-16 RX ORDER — LIDOCAINE HYDROCHLORIDE 10 MG/ML
INJECTION, SOLUTION EPIDURAL; INFILTRATION; INTRACAUDAL; PERINEURAL AS NEEDED
Status: DISCONTINUED | OUTPATIENT
Start: 2025-01-16 | End: 2025-01-16 | Stop reason: SURG

## 2025-01-16 RX ORDER — IBUPROFEN 600 MG/1
1 TABLET ORAL ONCE
Status: COMPLETED | OUTPATIENT
Start: 2025-01-16 | End: 2025-01-16

## 2025-01-16 RX ORDER — SODIUM CHLORIDE 9 MG/ML
30 INJECTION, SOLUTION INTRAVENOUS CONTINUOUS PRN
Status: CANCELLED | OUTPATIENT
Start: 2025-01-16 | End: 2025-01-17

## 2025-01-16 RX ORDER — DEXAMETHASONE SODIUM PHOSPHATE 4 MG/ML
INJECTION, SOLUTION INTRA-ARTICULAR; INTRALESIONAL; INTRAMUSCULAR; INTRAVENOUS; SOFT TISSUE AS NEEDED
Status: DISCONTINUED | OUTPATIENT
Start: 2025-01-16 | End: 2025-01-16 | Stop reason: SURG

## 2025-01-16 RX ORDER — PROPOFOL 10 MG/ML
INJECTION, EMULSION INTRAVENOUS AS NEEDED
Status: DISCONTINUED | OUTPATIENT
Start: 2025-01-16 | End: 2025-01-16 | Stop reason: SURG

## 2025-01-16 RX ORDER — SODIUM CHLORIDE 0.9 % (FLUSH) 0.9 %
10 SYRINGE (ML) INJECTION AS NEEDED
Status: DISCONTINUED | OUTPATIENT
Start: 2025-01-16 | End: 2025-01-17 | Stop reason: HOSPADM

## 2025-01-16 RX ORDER — ONDANSETRON 2 MG/ML
4 INJECTION INTRAMUSCULAR; INTRAVENOUS ONCE AS NEEDED
Status: DISCONTINUED | OUTPATIENT
Start: 2025-01-16 | End: 2025-01-17 | Stop reason: HOSPADM

## 2025-01-16 RX ORDER — SODIUM CHLORIDE, SODIUM LACTATE, POTASSIUM CHLORIDE, CALCIUM CHLORIDE 600; 310; 30; 20 MG/100ML; MG/100ML; MG/100ML; MG/100ML
INJECTION, SOLUTION INTRAVENOUS CONTINUOUS PRN
Status: DISCONTINUED | OUTPATIENT
Start: 2025-01-16 | End: 2025-01-16 | Stop reason: SURG

## 2025-01-16 RX ADMIN — PROPOFOL 200 MG: 10 INJECTION, EMULSION INTRAVENOUS at 18:27

## 2025-01-16 RX ADMIN — DEXAMETHASONE SODIUM PHOSPHATE 8 MG: 4 INJECTION, SOLUTION INTRAMUSCULAR; INTRAVENOUS at 18:32

## 2025-01-16 RX ADMIN — LIDOCAINE HYDROCHLORIDE 50 MG: 10 INJECTION, SOLUTION EPIDURAL; INFILTRATION; INTRACAUDAL; PERINEURAL at 18:27

## 2025-01-16 RX ADMIN — SODIUM CHLORIDE, SODIUM LACTATE, POTASSIUM CHLORIDE, AND CALCIUM CHLORIDE: .6; .31; .03; .02 INJECTION, SOLUTION INTRAVENOUS at 18:22

## 2025-01-16 RX ADMIN — GLUCAGON 1 MG: KIT at 16:44

## 2025-01-16 RX ADMIN — SUCCINYLCHOLINE CHLORIDE 100 MG: 20 INJECTION, SOLUTION INTRAMUSCULAR; INTRAVENOUS at 18:27

## 2025-01-16 RX ADMIN — ONDANSETRON 4 MG: 2 INJECTION INTRAMUSCULAR; INTRAVENOUS at 18:38

## 2025-01-16 NOTE — DISCHARGE INSTRUCTIONS
A responsible adult should stay with you and you should rest quietly for the rest of the day.    Do not drink alcohol, drive, operate any heavy machinery or power tools or make any legal/important decisions for the next 24 hours.     Progress your diet as tolerated.  If you begin to experience severe pain, increased shortness of breath, racing heartbeat or a fever above 101 F, seek immediate medical attention.     Follow up with MD as instructed. Call office for results in 3 to 5 days if needed.     Dr Hernandez [phone]    Impression:  Food impaction status post EGD with foreign body extraction  GE junction stricture  Linear furrows suggestive of EOE.  Chronic dysphagia.        Recommendations:  Follow-up histopathology  Increase PPI  Repeat EGD as an outpatient for dilation  Follow-up in the office

## 2025-01-16 NOTE — H&P
GI CONSULT  NOTE:    Referring Provider:    Ruth Owen MD  [unfilled]    Chief complaint: <principal problem not specified>    Subjective .     History of present illness:      Talon Zelaya is a 54 y.o. male who presents today for Procedure(s):  ESOPHAGOGASTRODUODENOSCOPY for the indications listed below.     He has chronic dysphagia and was seen by our staff in the office in early 2023.  He was scheduled for an outpatient EGD but canceled it.  He states he does not have any lower teeth and swallowed 2 Rolaids tablets at once this morning.  Since then he has been able to swallow anything.  He tried eating a banana would not go down.  He cannot swallow water.  He has a history of heartburn and takes PPI.  No prior EGD.    The updated Patient Profile was reviewed prior to the procedure, in conjunction with the Physical Exam, including medical conditions, surgical procedures, medications, allergies, family history and social history.     Pre-operatively, I reviewed the indication(s) for the procedure, the risks of the procedure [including but not limited to: unexpected bleeding possibly requiring hospitalization and/or unplanned repeat procedures, perforation possibly requiring surgical treatment, missed lesions and complications of sedation/MAC (also explained by anesthesia staff)].     I have evaluated the patient for risks associated with the planned anesthesia and the procedure to be performed and find the patient an acceptable candidate for IV sedation.    Multiple opportunities were provided for any questions or concerns, and all questions were answered satisfactorily before any anesthesia was administered. We will proceed with the planned procedure.    Past Medical History:  Past Medical History:   Diagnosis Date    Cancer 2023    Basal on nose    Colon polyp 08/20/21    4 non-cancerous    Diabetes mellitus 2016    Erectile dysfunction 2019    viagra and tadalafil  gives HA    GERD  "(gastroesophageal reflux disease)     Hypertension 2016    Amlodipine    Substance abuse 1990    clean for almost 6 months       Past Surgical History:  Past Surgical History:   Procedure Laterality Date    APPENDECTOMY  1978    COLONOSCOPY N/A 6/30/2021    Procedure: COLONOSCOPY with polypectomy X4;  Surgeon: Evin Winn MD;  Location: [facility] ENDOSCOPY;  Service: Gastroenterology;  Laterality: N/A;  Post: polyps       Social History:  Social History     Tobacco Use    Smoking status: Never    Smokeless tobacco: Never   Vaping Use    Vaping status: Never Used   Substance Use Topics    Alcohol use: Not Currently     Comment: Have had several relapses. Last one Dec. 2023    Drug use: Yes     Types: Amphetamines, Benzodiazepines, \"Crack\" cocaine, Cocaine(coke), Codeine, Heroin, Hydrocodone, Hydromorphone, Marijuana, Methamphetamines, Morphine, Oxycodone     Comment: Marijuana currently       Family History:  Family History   Problem Relation Age of Onset    Pulmonary embolism Mother     Obesity Mother         Morbid. Reportedsly around 400 lbs    Diabetes Mother     Hypertension Mother     Skin cancer Father     Heart disease Father         Quadruple bipass    Hypertension Father     Cancer Father         skin, mesothelioma    Hypertension Sister     Alcohol abuse Sister     Depression Sister     Hypertension Sister     Alcohol abuse Sister        Medications:  (Not in a hospital admission)      Scheduled Meds:   Continuous Infusions:   PRN Meds:.  [COMPLETED] Insert Peripheral IV **AND** sodium chloride    ALLERGIES:  Farxiga [dapagliflozin] and Lisinopril    ROS:  The following systems were reviewed and negative;   Constitution:  No fevers, chills, no unintentional weight loss  Skin: no rash, no jaundice  Eyes:  No blurry vision, no eye pain  HENT:  No change in hearing or smell  Resp:  No dyspnea or cough  CV:  No chest pain or palpitations  :  No dysuria, hematuria  Musculoskeletal:  No leg cramps or " "arthralgias  Neuro:  No tremor, no numbness  Psych:  No depression or confusion    Objective     Vital Signs:   Vitals:    01/16/25 1602   BP: 156/97   BP Location: Left arm   Patient Position: Sitting   Pulse: 91   Resp: 18   Temp: 97.8 °F (36.6 °C)   TempSrc: Oral   SpO2: 97%   Weight: 107 kg (235 lb 14.3 oz)   Height: 182.9 cm (72\")       Physical Exam:       General Appearance:    Awake and alert, in no acute distress   Head:    Normocephalic, without obvious abnormality, atraumatic   Throat:   No oral lesions, no thrush, oral mucosa moist   Lungs:     respirations regular, even and unlabored   Skin:   No rash, no jaundice       Results Review:  Lab Results (last 24 hours)       ** No results found for the last 24 hours. **            Imaging Results (Last 24 Hours)       ** No results found for the last 24 hours. **             I reviewed the patient's labs and imaging.    ASSESSMENT AND PLAN:      Active Problems:    * No active hospital problems. *       Procedure(s):  ESOPHAGOGASTRODUODENOSCOPY      I discussed the patients findings and my recommendations with the patient.    Yair Hernandez MD  01/16/25  18:17 EST              "

## 2025-01-16 NOTE — OP NOTE
ESOPHAGOGASTRODUODENOSCOPY Procedure Report    Patient Name:  Talon Zelaya  YOB: 1970    Date of Surgery:  1/16/2025     Pre-Op Diagnosis:  Foreign body  Dysphagia       Post Op Diagnosis:  Foreign body status post extraction, GE junction stricture, linear furrows suggestive of eosinophilic esophagitis      Procedure/CPT® Codes:      Procedure(s):  ESOPHAGOGASTRODUODENOSCOPY WITH FOREIGN BODY REMOVAL AND COLD FORCEP BIOPSY X1 AREA    Staff:  Surgeon(s):  Yair Hernandez MD         Anesthesia: General    Implants:    Nothing was implanted during the procedure    Specimen:        See Below    No blood loss    Complications:  None     Description of Procedure:  Informed consent was obtained for the procedure, including sedation.  Risks of perforation, hemorrhage, adverse drug reaction and aspiration were discussed.  The patient was brought into the endoscopy suite.  Patient was intubated by anesthesia.  Continuous cardiopulmonary monitoring was performed. The patient was placed in the left lateral decubitus position.  The bite block was inserted into the patient's mouth. After adequate sedation was attained, the Olympus gastroscope was inserted into the patient's mouth and advanced to the second portion of the duodenum without difficulty.  Circumferential examination was performed. A retroflex exam was performed in the patient's stomach.  On completion of the exam, the bowel was decompressed, the scope was removed from the patient, the patient tolerated the procedure well, there were no immediate post-operative complications.     Examination of the esophagus showed linear furrows suggestive of eosinophilic esophagitis.  Biopsies were obtained in the proximal esophagus and sent for pathology to evaluate for eosinophilic esophagitis.  Upon scope insertion, there was fluid in the esophagus and debris obstructing the distal esophagus.  As this was soft and mobile, I was able to pass the gastroscope  beyond the impaction.  There was noted to be a GE junction stricture which I was able to traverse.  With the tip of the scope, I was able to gently push the debris from the esophagus into the stomach.  There was irritation at the GE junction stricture so no dilation was performed.  Examination of the stomach showed normal mucosa  Retroflex examination of the stomach was normal   Examination of the duodenum showed normal mucosa      Impression:  Food impaction status post EGD with foreign body extraction  GE junction stricture  Linear furrows suggestive of EOE.  Chronic dysphagia.      Recommendations:  Follow-up histopathology  Increase PPI  Repeat EGD as an outpatient for dilation  Follow-up in the office      Yair Hernandez MD     Date: 1/16/2025  Time: 18:45 EST

## 2025-01-16 NOTE — ED PROVIDER NOTES
Subjective   History of Present Illness  Patient is a 54-year-old male who presents with a sensation of a foreign body in the esophagus and an inability to tolerate oral intake. He reports ingesting two whole Rolaids earlier today, after which he has been unable to keep anything down, including water, as he vomits everything he attempts to eat or drink. He denies any pain and has not noticed any blood in his vomitus. He has never experienced anything like this before.  Review of Systems  See HPI  Past Medical History:   Diagnosis Date    Cancer 2023    Basal on nose    Colon polyp 08/20/21    4 non-cancerous    Diabetes mellitus 2016    Erectile dysfunction 2019    viagra and tadalafil  gives HA    GERD (gastroesophageal reflux disease)     Hypertension 2016    Amlodipine    Substance abuse 1990    clean for almost 6 months       Allergies   Allergen Reactions    Farxiga [Dapagliflozin] Other (See Comments)     Nocturia      Lisinopril Unknown - Low Severity       Past Surgical History:   Procedure Laterality Date    APPENDECTOMY  1978    COLONOSCOPY N/A 6/30/2021    Procedure: COLONOSCOPY with polypectomy X4;  Surgeon: Evin Winn MD;  Location: [facility] ENDOSCOPY;  Service: Gastroenterology;  Laterality: N/A;  Post: polyps       Family History   Problem Relation Age of Onset    Pulmonary embolism Mother     Obesity Mother         Morbid. Reportedsly around 400 lbs    Diabetes Mother     Hypertension Mother     Skin cancer Father     Heart disease Father         Quadruple bipass    Hypertension Father     Cancer Father         skin, mesothelioma    Hypertension Sister     Alcohol abuse Sister     Depression Sister     Hypertension Sister     Alcohol abuse Sister        Social History     Socioeconomic History    Marital status:    Tobacco Use    Smoking status: Never    Smokeless tobacco: Never   Vaping Use    Vaping status: Never Used   Substance and Sexual Activity    Alcohol use: Not Currently      "Comment: Have had several relapses. Last one Dec. 2023    Drug use: Yes     Types: Amphetamines, Benzodiazepines, \"Crack\" cocaine, Cocaine(coke), Codeine, Heroin, Hydrocodone, Hydromorphone, Marijuana, Methamphetamines, Morphine, Oxycodone     Comment: Marijuana currently    Sexual activity: Not Currently     Partners: Female     Birth control/protection: Abstinence     Comment: erectile dysfunction           Objective   Physical Exam  No acute distress, regular rate and rhythm, no tachypnea or creased work of breathing, abdomen soft nontender without rebound or guarding, unremarkable oropharynx, moist oral mucosa.  Procedures           ED Course      /85 (BP Location: Left arm, Patient Position: Sitting)   Pulse 77   Temp 98.2 °F (36.8 °C) (Oral)   Resp 10   Ht 182.9 cm (72\")   Wt 107 kg (235 lb 14.3 oz)   SpO2 93%   BMI 31.99 kg/m²   Labs Reviewed   TISSUE PATHOLOGY EXAM     Medications   sodium chloride 0.9 % flush 10 mL (has no administration in time range)   ondansetron (ZOFRAN) injection 4 mg (has no administration in time range)   glucagon (GLUCAGEN) injection 1 mg (1 mg Intramuscular Given 1/16/25 2754)     No orders to display                                                      Medical Decision Making    Attempted glucagon and [employer].  Patient still vomiting.  Discussed with Dr. Arredondo with GI who will take patient for scope.  Food impaction appears to have been successfully resolved with GI.  Final diagnoses:   Esophageal obstruction due to food impaction       ED Disposition  ED Disposition       None            Ruth Owen MD  [address] [place], [place] [id number] [phone]               Medication List        ASK your doctor about these medications      Sleep Aid 50 MG capsule  Generic drug: diphenhydrAMINE HCl (Sleep)                 Stuart Chung MD  01/17/25 0131    "

## 2025-01-16 NOTE — ANESTHESIA PROCEDURE NOTES
Airway  Urgency: elective    Date/Time: 1/16/2025 6:28 PM    General Information and Staff    Patient location during procedure: OR  CRNA/CAA: Toni Randle CRNA    Indications and Patient Condition  Indications for airway management: airway protection    Preoxygenated: yes  MILS maintained throughout  Mask difficulty assessment: 1 - vent by mask    Final Airway Details  Final airway type: endotracheal airway      Successful airway: ETT  Cuffed: yes   Successful intubation technique: video laryngoscopy  Endotracheal tube insertion site: oral  Blade: Hutson  Blade size: 3  ETT size (mm): 7.5  Cormack-Lehane Classification: grade I - full view of glottis  Placement verified by: chest auscultation   Measured from: teeth  ETT/EBT  to teeth (cm): 22  Number of attempts at approach: 1  Assessment: lips, teeth, and gum same as pre-op and atraumatic intubation

## 2025-01-16 NOTE — ANESTHESIA PREPROCEDURE EVALUATION
" Anesthesia Evaluation     Patient summary reviewed and Nursing notes reviewed   NPO Solid Status: > 8 hours  NPO Liquid Status: > 2 hours           Airway   Mallampati: II  TM distance: >3 FB  Neck ROM: full  No difficulty expected  Dental - normal exam     Pulmonary    Cardiovascular     (+) hypertension, hyperlipidemia      Neuro/Psych  GI/Hepatic/Renal/Endo    (+) obesity, GERD, diabetes mellitus    Musculoskeletal     Abdominal    Substance History   (+) drug use     OB/GYN          Other   arthritis,   history of cancer    ROS/Med Hx Other: GLP-1 last dose 7 days ago    Hx of polysubstance abuse.  \"Clean for years\"                Anesthesia Plan    ASA 2     general   Rapid sequence  intravenous induction     Anesthetic plan, risks, benefits, and alternatives have been provided, discussed and informed consent has been obtained with: patient.    Plan discussed with CRNA.    CODE STATUS:         "

## 2025-01-17 NOTE — ANESTHESIA POSTPROCEDURE EVALUATION
Patient: Talon Zelaya    Procedure Summary       Date: 01/16/25 Room / Location: [facility] ENDOSCOPY 2 / [facility] ENDOSCOPY    Anesthesia Start: 1822 Anesthesia Stop: 1843    Procedure: ESOPHAGOGASTRODUODENOSCOPY WITH FOREIGN BODY REMOVAL AND COLD FORCEP BIOPSY X1 AREA Diagnosis: (Foreign body)    Surgeons: Yair Hernandez MD Provider: Sesar Nelson MD    Anesthesia Type: general ASA Status: 2            Anesthesia Type: general    Vitals  Vitals Value Taken Time   /85 01/16/25 1918   Temp 98.2 °F (36.8 °C) 01/16/25 1848   Pulse 82 01/16/25 1920   Resp 10 01/16/25 1918   SpO2 94 % 01/16/25 1920   Vitals shown include unfiled device data.        Post Anesthesia Care and Evaluation    Patient location during evaluation: PACU  Patient participation: complete - patient participated  Level of consciousness: awake  Pain scale: See nurse's notes for pain score.  Pain management: adequate    Airway patency: patent  Anesthetic complications: No anesthetic complications  PONV Status: none  Cardiovascular status: acceptable  Respiratory status: acceptable and spontaneous ventilation  Hydration status: acceptable    Comments: Patient seen and examined postoperatively; vital signs stable; SpO2 greater than or equal to 90%; cardiopulmonary status stable; nausea/vomiting adequately controlled; pain adequately controlled; no apparent anesthesia complications; patient discharged from anesthesia care when discharge criteria were met

## 2025-01-20 LAB
LAB AP CASE REPORT: NORMAL
PATH REPORT.FINAL DX SPEC: NORMAL
PATH REPORT.GROSS SPEC: NORMAL

## 2025-01-22 ENCOUNTER — ON CAMPUS - OUTPATIENT (AMBULATORY)
Dept: URBAN - METROPOLITAN AREA HOSPITAL 2 | Facility: HOSPITAL | Age: 55
End: 2025-01-22
Payer: COMMERCIAL

## 2025-01-22 ENCOUNTER — OFFICE (AMBULATORY)
Dept: URBAN - METROPOLITAN AREA PATHOLOGY 19 | Facility: PATHOLOGY | Age: 55
End: 2025-01-22
Payer: COMMERCIAL

## 2025-01-22 ENCOUNTER — PATIENT MESSAGE (OUTPATIENT)
Dept: FAMILY MEDICINE CLINIC | Facility: CLINIC | Age: 55
End: 2025-01-22
Payer: COMMERCIAL

## 2025-01-22 VITALS
OXYGEN SATURATION: 96 % | HEART RATE: 74 BPM | HEART RATE: 71 BPM | SYSTOLIC BLOOD PRESSURE: 130 MMHG | SYSTOLIC BLOOD PRESSURE: 119 MMHG | OXYGEN SATURATION: 100 % | DIASTOLIC BLOOD PRESSURE: 58 MMHG | HEART RATE: 65 BPM | TEMPERATURE: 97.7 F | OXYGEN SATURATION: 97 % | SYSTOLIC BLOOD PRESSURE: 118 MMHG | SYSTOLIC BLOOD PRESSURE: 109 MMHG | RESPIRATION RATE: 15 BRPM | DIASTOLIC BLOOD PRESSURE: 83 MMHG | HEART RATE: 80 BPM | HEART RATE: 78 BPM | RESPIRATION RATE: 16 BRPM | HEART RATE: 83 BPM | WEIGHT: 236 LBS | HEIGHT: 72 IN | DIASTOLIC BLOOD PRESSURE: 101 MMHG | DIASTOLIC BLOOD PRESSURE: 73 MMHG | SYSTOLIC BLOOD PRESSURE: 135 MMHG | DIASTOLIC BLOOD PRESSURE: 97 MMHG | SYSTOLIC BLOOD PRESSURE: 91 MMHG | OXYGEN SATURATION: 95 % | OXYGEN SATURATION: 99 % | DIASTOLIC BLOOD PRESSURE: 65 MMHG | OXYGEN SATURATION: 98 % | DIASTOLIC BLOOD PRESSURE: 81 MMHG | HEART RATE: 73 BPM | SYSTOLIC BLOOD PRESSURE: 108 MMHG

## 2025-01-22 DIAGNOSIS — K22.2 ESOPHAGEAL OBSTRUCTION: ICD-10-CM

## 2025-01-22 DIAGNOSIS — K44.9 DIAPHRAGMATIC HERNIA WITHOUT OBSTRUCTION OR GANGRENE: ICD-10-CM

## 2025-01-22 DIAGNOSIS — K31.89 OTHER DISEASES OF STOMACH AND DUODENUM: ICD-10-CM

## 2025-01-22 DIAGNOSIS — R13.10 DYSPHAGIA, UNSPECIFIED: ICD-10-CM

## 2025-01-22 PROBLEM — K29.70 GASTRITIS, UNSPECIFIED, WITHOUT BLEEDING: Status: ACTIVE | Noted: 2025-01-22

## 2025-01-22 LAB
GI HISTOLOGY: A. STOMACH ANTRUM: (no result)
GI HISTOLOGY: PDF REPORT: (no result)

## 2025-01-22 PROCEDURE — 88305 TISSUE EXAM BY PATHOLOGIST: CPT | Performed by: PATHOLOGY

## 2025-01-22 PROCEDURE — 43239 EGD BIOPSY SINGLE/MULTIPLE: CPT | Mod: 59 | Performed by: INTERNAL MEDICINE

## 2025-01-22 PROCEDURE — 43249 ESOPH EGD DILATION <30 MM: CPT | Performed by: INTERNAL MEDICINE

## 2025-01-23 RX ORDER — OMEPRAZOLE 40 MG/1
40 CAPSULE, DELAYED RELEASE ORAL DAILY
Qty: 90 CAPSULE | Refills: 1 | Status: SHIPPED | OUTPATIENT
Start: 2025-01-23

## 2025-01-27 RX ORDER — SEMAGLUTIDE 1.34 MG/ML
1 INJECTION, SOLUTION SUBCUTANEOUS
Qty: 3 ML | Refills: 0 | Status: SHIPPED | OUTPATIENT
Start: 2025-01-27

## 2025-02-11 RX ORDER — ALLOPURINOL 100 MG/1
100 TABLET ORAL DAILY
Qty: 90 TABLET | Refills: 0 | Status: SHIPPED | OUTPATIENT
Start: 2025-02-11

## 2025-02-18 ENCOUNTER — OFFICE (AMBULATORY)
Dept: URBAN - METROPOLITAN AREA CLINIC 64 | Facility: CLINIC | Age: 55
End: 2025-02-18

## 2025-02-18 VITALS
HEIGHT: 72 IN | HEART RATE: 81 BPM | WEIGHT: 233 LBS | SYSTOLIC BLOOD PRESSURE: 111 MMHG | DIASTOLIC BLOOD PRESSURE: 76 MMHG

## 2025-02-18 DIAGNOSIS — K22.2 ESOPHAGEAL OBSTRUCTION: ICD-10-CM

## 2025-02-18 DIAGNOSIS — R13.10 DYSPHAGIA, UNSPECIFIED: ICD-10-CM

## 2025-02-18 DIAGNOSIS — E66.9 OBESITY, UNSPECIFIED: ICD-10-CM

## 2025-02-18 PROCEDURE — 99213 OFFICE O/P EST LOW 20 MIN: CPT | Performed by: INTERNAL MEDICINE

## 2025-02-23 NOTE — PATIENT INSTRUCTIONS
Health Maintenance Due   Topic Date Due    DIABETIC EYE EXAM  Never done    Hepatitis B (1 of 3 - 19+ 3-dose series) Never done    TDAP/TD VACCINES (1 - Tdap) Never done    URINE MICROALBUMIN-CREATININE RATIO (uACR)  01/26/2024    INFLUENZA VACCINE  07/01/2024    COVID-19 Vaccine (5 - 2024-25 season) 09/01/2024    HEMOGLOBIN A1C  05/01/2025    Try Yogart for bowel correction and call when back to normal  and will try Trulicity  Satop glimepiride after gets Metformen

## 2025-02-25 ENCOUNTER — LAB (OUTPATIENT)
Dept: FAMILY MEDICINE CLINIC | Facility: CLINIC | Age: 55
End: 2025-02-25
Payer: COMMERCIAL

## 2025-02-25 ENCOUNTER — OFFICE VISIT (OUTPATIENT)
Dept: FAMILY MEDICINE CLINIC | Facility: CLINIC | Age: 55
End: 2025-02-25
Payer: COMMERCIAL

## 2025-02-25 VITALS
WEIGHT: 231 LBS | SYSTOLIC BLOOD PRESSURE: 107 MMHG | BODY MASS INDEX: 31.29 KG/M2 | RESPIRATION RATE: 18 BRPM | DIASTOLIC BLOOD PRESSURE: 73 MMHG | OXYGEN SATURATION: 95 % | HEIGHT: 72 IN | HEART RATE: 80 BPM

## 2025-02-25 DIAGNOSIS — R35.1 NOCTURIA: ICD-10-CM

## 2025-02-25 DIAGNOSIS — K21.9 GASTROESOPHAGEAL REFLUX DISEASE, UNSPECIFIED WHETHER ESOPHAGITIS PRESENT: ICD-10-CM

## 2025-02-25 DIAGNOSIS — E11.43 TYPE 2 DIABETES MELLITUS WITH DIABETIC AUTONOMIC NEUROPATHY, WITHOUT LONG-TERM CURRENT USE OF INSULIN: ICD-10-CM

## 2025-02-25 DIAGNOSIS — I15.9 SECONDARY HYPERTENSION: ICD-10-CM

## 2025-02-25 DIAGNOSIS — M1A.0790 CHRONIC GOUT OF FOOT, UNSPECIFIED CAUSE, UNSPECIFIED LATERALITY: ICD-10-CM

## 2025-02-25 DIAGNOSIS — E66.811 CLASS 1 OBESITY DUE TO EXCESS CALORIES WITH SERIOUS COMORBIDITY AND BODY MASS INDEX (BMI) OF 31.0 TO 31.9 IN ADULT: ICD-10-CM

## 2025-02-25 DIAGNOSIS — E66.09 CLASS 1 OBESITY DUE TO EXCESS CALORIES WITH SERIOUS COMORBIDITY AND BODY MASS INDEX (BMI) OF 31.0 TO 31.9 IN ADULT: ICD-10-CM

## 2025-02-25 DIAGNOSIS — K22.2 ESOPHAGEAL OBSTRUCTION: ICD-10-CM

## 2025-02-25 DIAGNOSIS — E78.49 OTHER HYPERLIPIDEMIA: ICD-10-CM

## 2025-02-25 DIAGNOSIS — I15.9 SECONDARY HYPERTENSION: Primary | ICD-10-CM

## 2025-02-25 DIAGNOSIS — F19.10 SUBSTANCE ABUSE: ICD-10-CM

## 2025-02-25 PROBLEM — K63.5 COLON POLYPS: Status: ACTIVE | Noted: 2025-02-25

## 2025-02-25 PROBLEM — K29.70 GASTRITIS, UNSPECIFIED, WITHOUT BLEEDING: Status: ACTIVE | Noted: 2025-01-22

## 2025-02-25 PROBLEM — K44.9 DIAPHRAGMATIC HERNIA WITHOUT OBSTRUCTION OR GANGRENE: Status: ACTIVE | Noted: 2025-01-22

## 2025-02-25 PROBLEM — K21.00 GASTRO-ESOPHAGEAL REFLUX DISEASE WITH ESOPHAGITIS: Status: ACTIVE | Noted: 2025-02-25

## 2025-02-25 LAB
ALBUMIN SERPL-MCNC: 4.2 G/DL (ref 3.5–5.2)
ALBUMIN UR-MCNC: <1.2 MG/DL
ALBUMIN/GLOB SERPL: 1.2 G/DL
ALP SERPL-CCNC: 133 U/L (ref 39–117)
ALT SERPL W P-5'-P-CCNC: 24 U/L (ref 1–41)
ANION GAP SERPL CALCULATED.3IONS-SCNC: 11.7 MMOL/L (ref 5–15)
AST SERPL-CCNC: 24 U/L (ref 1–40)
BASOPHILS # BLD AUTO: 0.04 10*3/MM3 (ref 0–0.2)
BASOPHILS NFR BLD AUTO: 0.6 % (ref 0–1.5)
BILIRUB SERPL-MCNC: 0.4 MG/DL (ref 0–1.2)
BUN SERPL-MCNC: 11 MG/DL (ref 6–20)
BUN/CREAT SERPL: 10.1 (ref 7–25)
CALCIUM SPEC-SCNC: 10 MG/DL (ref 8.6–10.5)
CHLORIDE SERPL-SCNC: 104 MMOL/L (ref 98–107)
CHOLEST SERPL-MCNC: 162 MG/DL (ref 0–200)
CO2 SERPL-SCNC: 26.3 MMOL/L (ref 22–29)
CREAT SERPL-MCNC: 1.09 MG/DL (ref 0.76–1.27)
CREAT UR-MCNC: 221.9 MG/DL
DEPRECATED RDW RBC AUTO: 42.8 FL (ref 37–54)
EGFRCR SERPLBLD CKD-EPI 2021: 79.7 ML/MIN/1.73
EOSINOPHIL # BLD AUTO: 0.23 10*3/MM3 (ref 0–0.4)
EOSINOPHIL NFR BLD AUTO: 3.2 % (ref 0.3–6.2)
ERYTHROCYTE [DISTWIDTH] IN BLOOD BY AUTOMATED COUNT: 13 % (ref 12.3–15.4)
GLOBULIN UR ELPH-MCNC: 3.4 GM/DL
GLUCOSE SERPL-MCNC: 106 MG/DL (ref 65–99)
HBA1C MFR BLD: 5.8 % (ref 4.8–5.6)
HCT VFR BLD AUTO: 48.9 % (ref 37.5–51)
HDLC SERPL-MCNC: 46 MG/DL (ref 40–60)
HGB BLD-MCNC: 16.8 G/DL (ref 13–17.7)
IMM GRANULOCYTES # BLD AUTO: 0.02 10*3/MM3 (ref 0–0.05)
IMM GRANULOCYTES NFR BLD AUTO: 0.3 % (ref 0–0.5)
LDLC SERPL CALC-MCNC: 99 MG/DL (ref 0–100)
LDLC/HDLC SERPL: 2.12 {RATIO}
LYMPHOCYTES # BLD AUTO: 1.67 10*3/MM3 (ref 0.7–3.1)
LYMPHOCYTES NFR BLD AUTO: 23.3 % (ref 19.6–45.3)
MAGNESIUM SERPL-MCNC: 2.3 MG/DL (ref 1.6–2.6)
MCH RBC QN AUTO: 30.7 PG (ref 26.6–33)
MCHC RBC AUTO-ENTMCNC: 34.4 G/DL (ref 31.5–35.7)
MCV RBC AUTO: 89.4 FL (ref 79–97)
MICROALBUMIN/CREAT UR: NORMAL MG/G{CREAT}
MONOCYTES # BLD AUTO: 0.72 10*3/MM3 (ref 0.1–0.9)
MONOCYTES NFR BLD AUTO: 10 % (ref 5–12)
NEUTROPHILS NFR BLD AUTO: 4.5 10*3/MM3 (ref 1.7–7)
NEUTROPHILS NFR BLD AUTO: 62.6 % (ref 42.7–76)
NRBC BLD AUTO-RTO: 0 /100 WBC (ref 0–0.2)
PLATELET # BLD AUTO: 271 10*3/MM3 (ref 140–450)
PMV BLD AUTO: 10.7 FL (ref 6–12)
POTASSIUM SERPL-SCNC: 4.2 MMOL/L (ref 3.5–5.2)
PROT SERPL-MCNC: 7.6 G/DL (ref 6–8.5)
RBC # BLD AUTO: 5.47 10*6/MM3 (ref 4.14–5.8)
SODIUM SERPL-SCNC: 142 MMOL/L (ref 136–145)
T4 FREE SERPL-MCNC: 0.93 NG/DL (ref 0.93–1.7)
TRIGL SERPL-MCNC: 92 MG/DL (ref 0–150)
TSH SERPL DL<=0.05 MIU/L-ACNC: 5.04 UIU/ML (ref 0.27–4.2)
URATE SERPL-MCNC: 5.2 MG/DL (ref 3.4–7)
VIT B12 BLD-MCNC: 715 PG/ML (ref 211–946)
VLDLC SERPL-MCNC: 17 MG/DL (ref 5–40)
WBC NRBC COR # BLD AUTO: 7.18 10*3/MM3 (ref 3.4–10.8)

## 2025-02-25 PROCEDURE — 82570 ASSAY OF URINE CREATININE: CPT | Performed by: PREVENTIVE MEDICINE

## 2025-02-25 PROCEDURE — 84439 ASSAY OF FREE THYROXINE: CPT | Performed by: PREVENTIVE MEDICINE

## 2025-02-25 PROCEDURE — 80050 GENERAL HEALTH PANEL: CPT | Performed by: PREVENTIVE MEDICINE

## 2025-02-25 PROCEDURE — 84550 ASSAY OF BLOOD/URIC ACID: CPT | Performed by: PREVENTIVE MEDICINE

## 2025-02-25 PROCEDURE — 99214 OFFICE O/P EST MOD 30 MIN: CPT | Performed by: PREVENTIVE MEDICINE

## 2025-02-25 PROCEDURE — 82043 UR ALBUMIN QUANTITATIVE: CPT | Performed by: PREVENTIVE MEDICINE

## 2025-02-25 PROCEDURE — 36415 COLL VENOUS BLD VENIPUNCTURE: CPT

## 2025-02-25 PROCEDURE — 83735 ASSAY OF MAGNESIUM: CPT | Performed by: PREVENTIVE MEDICINE

## 2025-02-25 PROCEDURE — 82607 VITAMIN B-12: CPT | Performed by: PREVENTIVE MEDICINE

## 2025-02-25 PROCEDURE — 83036 HEMOGLOBIN GLYCOSYLATED A1C: CPT | Performed by: PREVENTIVE MEDICINE

## 2025-02-25 PROCEDURE — 80061 LIPID PANEL: CPT | Performed by: PREVENTIVE MEDICINE

## 2025-02-25 RX ORDER — METFORMIN HYDROCHLORIDE EXTENDED-RELEASE TABLETS 500 MG/1
500 TABLET, FILM COATED, EXTENDED RELEASE ORAL
Qty: 90 TABLET | Refills: 1 | Status: SHIPPED | OUTPATIENT
Start: 2025-02-25

## 2025-02-25 NOTE — PROGRESS NOTES
rBenda Zelaya is a 54 y.o. male presents for   Chief Complaint   Patient presents with    Diabetes    Hypertension       Health Maintenance Due   Topic Date Due    DIABETIC EYE EXAM  Never done    Hepatitis B (1 of 3 - 19+ 3-dose series) Never done    TDAP/TD VACCINES (1 - Tdap) Never done    URINE MICROALBUMIN-CREATININE RATIO (uACR)  01/26/2024    INFLUENZA VACCINE  07/01/2024    COVID-19 Vaccine (5 - 2024-25 season) 09/01/2024    HEMOGLOBIN A1C  05/01/2025       Diabetes  Pertinent negatives for diabetes include no chest pain, no fatigue and no weakness.   Hypertension  Pertinent negatives include no chest pain or neck pain.      History of Present Illness  The patient is a 54-year-old male who is here today to follow up on esophageal obstruction, hypertension, chronic gout, substance abuse, GERD, hyperlipidemia, class I obesity due to excess calories with serious comorbidities, nocturia, and type 2 diabetes.    He reports an incident of swallowing two whole Rolaids, which did not dissolve and caused regurgitation of any consumed food or drink. This necessitated an emergency room visit where his gastroenterologist performed an esophageal dilation, resulting in an 80 percent improvement. He attributes the remaining 20 percent to his poor dental health and inadequate mastication, a problem he has been dealing with for the past 25 years. He is currently not experiencing any dysphagia.    He expresses dissatisfaction with Ozempic, which he had been taking for 3 to 4 months, citing numerous side effects including daily abdominal discomfort, constipation, and diarrhea. He discontinued the medication approximately a month ago, which led to a significant improvement in his gastrointestinal symptoms. He reports no current issues with bowel movements but mentions severe cramping and diarrhea after consuming salads on two or three occasions. He also reports a weight loss of 5 pounds, which he attributes to a  "stomach virus he contracted 2 to 3 weeks ago, although he has since regained some weight. He is interested in trying a different medication similar to Ozempic.    He has been monitoring his blood glucose levels and reports no episodes of hypoglycemia since resuming the half-dose of glimepiride. He recalls one instance of a blood glucose level of 60 while on Ozempic. He is considering reintroducing metformin into his regimen, as he did not experience any adverse effects from it in the past. He has made significant dietary changes, including eliminating snack cakes and incorporating more chicken and salads into his meals.    He reports no recent gout flare-ups and confirms that he is not using any illicit substances. He has been abstinent from alcohol for the past year and one month.    He is making efforts to reduce his intake of saturated fats. He reports no episodes of nocturia.    He reports no changes in the mole on the side of his head. He notes that it becomes more noticeable when he gets a haircut. He regularly visits the dermatologist for check-ups, and everything has been fine so far.    SOCIAL HISTORY  The patient reports no substance use and has not had alcohol for a year and a month.    MEDICATIONS  Discontinued: Ozempic  Current: glimepiride    IMMUNIZATIONS  The patient has received COVID-19 and influenza vaccines.    Vitals:    02/25/25 0820 02/25/25 0823   BP: 112/73 107/73   Pulse: 80    Resp: 18    SpO2: 95%    Weight: 105 kg (231 lb)    Height: 182.9 cm (72\")      Body mass index is 31.33 kg/m².    Current Outpatient Medications on File Prior to Visit   Medication Sig Dispense Refill    allopurinol (ZYLOPRIM) 100 MG tablet Take 1 tablet by mouth once daily 90 tablet 0    amLODIPine (NORVASC) 10 MG tablet Take 1 tablet by mouth Daily. 90 tablet 1    aspirin 81 MG oral suspension Take 81 mL by mouth 1 (One) Time.      desmopressin (DDAVP) 0.2 MG tablet Take  by mouth every night at bedtime.      " gabapentin (NEURONTIN) 300 MG capsule Take 1 capsule by mouth 3 (Three) Times a Day. 270 capsule 1    glimepiride (AMARYL) 1 MG tablet TAKE 1 TABLET BY MOUTH ONCE DAILY IN THE MORNING BEFORE BREAKFAST 90 tablet 0    multivitamin (THERAGRAN) tablet tablet       omeprazole (priLOSEC) 40 MG capsule Take 1 capsule by mouth Daily. 90 capsule 1    pravastatin (PRAVACHOL) 80 MG tablet Take 1 tablet by mouth Daily. 90 tablet 3    [DISCONTINUED] diphenhydrAMINE HCl, Sleep, (Sleep Aid) 50 MG capsule  (Patient not taking: Reported on 11/1/2024)      [DISCONTINUED] Semaglutide, 1 MG/DOSE, (Ozempic, 1 MG/DOSE,) 4 MG/3ML solution pen-injector Inject 1 mg under the skin into the appropriate area as directed Every 7 (Seven) Days. Continue for 4 weeks 3 mL 0     No current facility-administered medications on file prior to visit.       The following portions of the patient's history were reviewed and updated as appropriate: allergies, current medications, past family history, past medical history, past social history, past surgical history, and problem list.    Review of Systems   Constitutional:  Negative for chills, diaphoresis, fatigue and fever.   HENT:  Negative for congestion, sore throat and swollen glands.    Respiratory:  Negative for cough.    Cardiovascular:  Negative for chest pain.   Gastrointestinal:  Negative for abdominal pain, nausea and vomiting.   Genitourinary:  Negative for dysuria.   Musculoskeletal:  Negative for myalgias and neck pain.   Skin:  Negative for rash.   Neurological:  Negative for weakness and numbness.       Objective   Physical Exam  Vitals reviewed.   Constitutional:       General: He is not in acute distress.     Appearance: He is well-developed. He is obese. He is not ill-appearing or toxic-appearing.   HENT:      Head: Normocephalic and atraumatic.      Right Ear: Tympanic membrane, ear canal and external ear normal.      Left Ear: Tympanic membrane, ear canal and external ear normal.       Nose: Nose normal.      Mouth/Throat:      Mouth: Mucous membranes are moist.      Pharynx: Posterior oropharyngeal erythema present.   Eyes:      Extraocular Movements: Extraocular movements intact.      Conjunctiva/sclera: Conjunctivae normal.      Pupils: Pupils are equal, round, and reactive to light.   Neck:      Vascular: No carotid bruit.   Cardiovascular:      Rate and Rhythm: Normal rate and regular rhythm.      Pulses:           Dorsalis pedis pulses are 1+ on the right side and 1+ on the left side.        Posterior tibial pulses are 1+ on the right side and 1+ on the left side.      Heart sounds: Normal heart sounds.   Pulmonary:      Effort: Pulmonary effort is normal.      Comments: Decreased breath sounds bilaterally  Abdominal:      General: Bowel sounds are normal. There is no distension.      Palpations: Abdomen is soft. There is no mass.      Tenderness: There is no abdominal tenderness. There is no right CVA tenderness or left CVA tenderness.   Musculoskeletal:         General: Normal range of motion.      Cervical back: Neck supple. No tenderness.   Feet:      Right foot:      Protective Sensation: 10 sites tested.  10 sites sensed.      Skin integrity: Skin integrity normal. Callus present.      Toenail Condition: Right toenails are normal.      Left foot:      Protective Sensation: 10 sites tested.  10 sites sensed.      Skin integrity: Skin integrity normal. Callus present.      Toenail Condition: Left toenails are normal.      Comments: Diabetic Foot Exam Performed and Monofilament Test Performed    Lymphadenopathy:      Cervical: No cervical adenopathy.   Skin:     General: Skin is warm.   Neurological:      General: No focal deficit present.      Mental Status: He is alert and oriented to person, place, and time.   Psychiatric:         Mood and Affect: Mood normal.         Behavior: Behavior normal.       Physical Exam  Lungs were auscultated.  Heart rate and rhythm are normal.    PHQ-9  Total Score:    Results           Assessment & Plan   Diagnoses and all orders for this visit:    1. Secondary hypertension (Primary)  -     CBC Auto Differential; Future  -     Comprehensive Metabolic Panel; Future    2. Chronic gout of foot, unspecified cause, unspecified laterality  -     Uric Acid; Future    3. Substance abuse    4. Gastroesophageal reflux disease, unspecified whether esophagitis present  -     Magnesium; Future    5. Other hyperlipidemia  -     Lipid Panel; Future    6. Class 1 obesity due to excess calories with serious comorbidity and body mass index (BMI) of 31.0 to 31.9 in adult    7. Nocturia    8. Type 2 diabetes mellitus with diabetic autonomic neuropathy, without long-term current use of insulin  -     Hemoglobin A1c; Future  -     Microalbumin / Creatinine Urine Ratio - Urine, Clean Catch; Future  -     TSH Rfx On Abnormal To Free T4; Future  -     Vitamin B12; Future    9. Esophageal obstruction    Other orders  -     metFORMIN (FORTAMET) 500 MG (OSM) 24 hr tablet; Take 1 tablet by mouth Daily With Breakfast.  Dispense: 90 tablet; Refill: 1      Assessment & Plan  1. Esophageal obstruction.  He reports an 80% improvement following the esophageal dilation procedure. He is advised to contact his gastroenterologist if symptoms recur.    2. Type 2 diabetes mellitus.  He experienced significant side effects from Ozempic, including stomach discomfort, constipation, and diarrhea, leading to discontinuation of the medication. He has since resumed taking half a tablet of glimepiride. His A1c levels were within the normal range during his last lab work in November 2024. A prescription for metformin 500 mg has been provided, with instructions to discontinue glimepiride upon starting metformin. If blood sugar levels exceed 170, he is advised to increase the metformin dosage to 1000 mg. An A1c test will be conducted today. If the A1c level is above 7 or 8, he may need to resume glimepiride.    3.  Hypertension.  His blood pressure readings have been consistently within the normal range.    4. Chronic gout.  He reports no recent gout attacks.    5. Substance abuse.  He has abstained from alcohol consumption for the past year and one month.    6. Hyperlipidemia.  He is making efforts to monitor his intake of saturated fats.    7. Class I obesity with serious comorbidities.  He has lost weight and is maintaining a healthier diet.    8. Nocturia.  He reports no issues with nocturia.    9. Gastroesophageal reflux disease (GERD).  He reports no current issues with heartburn.    10. Health maintenance.  He has received his COVID-19 and influenza vaccines. He is advised to wear a mask in crowded places to prevent infection.    11. Mole on the side of the head.    Follow-up  The patient will follow up in 3 months.    PROCEDURE  The patient underwent an esophageal dilation procedure during a recent emergency room visit.    Patient Instructions     Health Maintenance Due   Topic Date Due    DIABETIC EYE EXAM  Never done    Hepatitis B (1 of 3 - 19+ 3-dose series) Never done    TDAP/TD VACCINES (1 - Tdap) Never done    URINE MICROALBUMIN-CREATININE RATIO (uACR)  01/26/2024    INFLUENZA VACCINE  07/01/2024    COVID-19 Vaccine (5 - 2024-25 season) 09/01/2024    HEMOGLOBIN A1C  05/01/2025    Try Yogart for bowel correction and call when back to normal  and will try Trulicity  Satop glimepiride after gets Metformen       Patient or patient representative verbalized consent for the use of Ambient Listening during the visit with  Ruth Owen MD for chart documentation. 2/25/2025  13:09 EST

## 2025-02-27 RX ORDER — EZETIMIBE 10 MG/1
10 TABLET ORAL DAILY
Qty: 90 TABLET | Refills: 3 | Status: SHIPPED | OUTPATIENT
Start: 2025-02-27

## 2025-02-27 NOTE — PROGRESS NOTES
Blood sugar was only 106 and A1c is only slightly elevated at 5.8.  If you want to try to come off of the glimepiride I think that that is reasonable just make sure you continue to monitor your blood sugars regularly and we will see what that does to your A1c.  Your TSH is elevated but the active ingredient called T4 is normal so continue the same dose.  Bad cholesterol is 99 and goal is below 70 can you do more to decrease your saturated fats and increase your walking or would you like to add Zetia to the maximum dose of pravastatin please let me know?  Call if you have any other questions or concerns

## 2025-03-04 ENCOUNTER — HOSPITAL ENCOUNTER (OUTPATIENT)
Dept: CT IMAGING | Facility: HOSPITAL | Age: 55
Discharge: HOME | End: 2025-03-04

## 2025-03-04 ENCOUNTER — HOSPITAL ENCOUNTER (OUTPATIENT)
Dept: CARDIOLOGY | Facility: HOSPITAL | Age: 55
Discharge: HOME | End: 2025-03-04

## 2025-03-04 DIAGNOSIS — Z13.6 ENCOUNTER FOR SCREENING FOR VASCULAR DISEASE: ICD-10-CM

## 2025-03-04 LAB
BH CV VAS SCREENING CAROTID CCA LEFT: 100 CM/SEC
BH CV VAS SCREENING CAROTID CCA RIGHT: 89 CM/SEC
BH CV VAS SCREENING CAROTID ICA LEFT: 67 CM/SEC
BH CV VAS SCREENING CAROTID ICA RIGHT: 79 CM/SEC
BH CV XLRA MEAS - MID AO DIAM: 2.2 CM
BH CV XLRA MEAS - PAD LEFT ABI PT: 1.17
BH CV XLRA MEAS - PAD LEFT ARM: 118 MMHG
BH CV XLRA MEAS - PAD LEFT LEG PT: 150 MMHG
BH CV XLRA MEAS - PAD RIGHT ABI PT: 1.2
BH CV XLRA MEAS - PAD RIGHT ARM: 128 MMHG
BH CV XLRA MEAS - PAD RIGHT LEG PT: 154 MMHG
BH CV XLRA MEAS LEFT DIST CCA EDV: -34.8 CM/SEC
BH CV XLRA MEAS LEFT DIST CCA PSV: -100 CM/SEC
BH CV XLRA MEAS LEFT ICA/CCA RATIO: 0.7
BH CV XLRA MEAS LEFT PROX ICA EDV: -32.3 CM/SEC
BH CV XLRA MEAS LEFT PROX ICA PSV: -67.1 CM/SEC
BH CV XLRA MEAS RIGHT DIST CCA EDV: -29.8 CM/SEC
BH CV XLRA MEAS RIGHT DIST CCA PSV: -88.8 CM/SEC
BH CV XLRA MEAS RIGHT ICA/CCA RATIO: 0.9
BH CV XLRA MEAS RIGHT PROX ICA EDV: -26.7 CM/SEC
BH CV XLRA MEAS RIGHT PROX ICA PSV: -78.9 CM/SEC

## 2025-03-04 PROCEDURE — 93799 UNLISTED CV SVC/PROCEDURE: CPT

## 2025-03-04 PROCEDURE — VASCULARSCN2 VASCULAR SCREENING (BUNDLE) CAR: Performed by: INTERNAL MEDICINE

## 2025-03-04 PROCEDURE — 75571 CT HRT W/O DYE W/CA TEST: CPT

## 2025-03-10 RX ORDER — GABAPENTIN 300 MG/1
300 CAPSULE ORAL 3 TIMES DAILY
Qty: 270 CAPSULE | Refills: 1 | Status: SHIPPED | OUTPATIENT
Start: 2025-03-10

## 2025-03-10 NOTE — TELEPHONE ENCOUNTER
Rx Refill Note  Requested Prescriptions     Pending Prescriptions Disp Refills   • gabapentin (NEURONTIN) 300 MG capsule 270 capsule 1     Sig: Take 1 capsule by mouth 3 (Three) Times a Day.      Last office visit with prescribing clinician: 2/25/2025   Last telemedicine visit with prescribing clinician: Visit date not found   Next office visit with prescribing clinician: 5/27/2025       Would you like a call back once the refill request has been completed: [] Yes [] No    If the office needs to give you a call back, can they leave a voicemail: [] Yes [] No    Sharron Metzger MA  03/10/25, 09:20 EDT

## 2025-03-24 RX ORDER — OMEPRAZOLE 20 MG/1
20 CAPSULE, DELAYED RELEASE ORAL DAILY
Qty: 90 CAPSULE | Refills: 0 | OUTPATIENT
Start: 2025-03-24

## 2025-03-27 RX ORDER — GABAPENTIN 600 MG/1
600 TABLET ORAL 3 TIMES DAILY
Qty: 270 TABLET | Refills: 0 | Status: SHIPPED | OUTPATIENT
Start: 2025-03-27

## 2025-04-09 RX ORDER — AMLODIPINE BESYLATE 10 MG/1
10 TABLET ORAL DAILY
Qty: 90 TABLET | Refills: 0 | Status: SHIPPED | OUTPATIENT
Start: 2025-04-09

## 2025-04-30 ENCOUNTER — TELEPHONE (OUTPATIENT)
Dept: FAMILY MEDICINE CLINIC | Facility: CLINIC | Age: 55
End: 2025-04-30
Payer: COMMERCIAL

## 2025-04-30 ENCOUNTER — APPOINTMENT (OUTPATIENT)
Dept: GENERAL RADIOLOGY | Facility: HOSPITAL | Age: 55
End: 2025-04-30
Payer: COMMERCIAL

## 2025-04-30 ENCOUNTER — HOSPITAL ENCOUNTER (EMERGENCY)
Facility: HOSPITAL | Age: 55
Discharge: LEFT BEFORE DISCHARGE | End: 2025-04-30
Attending: EMERGENCY MEDICINE
Payer: COMMERCIAL

## 2025-04-30 VITALS
OXYGEN SATURATION: 96 % | SYSTOLIC BLOOD PRESSURE: 119 MMHG | HEART RATE: 85 BPM | RESPIRATION RATE: 17 BRPM | DIASTOLIC BLOOD PRESSURE: 86 MMHG | HEIGHT: 72 IN | TEMPERATURE: 97.4 F | BODY MASS INDEX: 32.04 KG/M2 | WEIGHT: 236.55 LBS

## 2025-04-30 DIAGNOSIS — R79.89 ELEVATED TROPONIN: ICD-10-CM

## 2025-04-30 DIAGNOSIS — T78.40XA ALLERGIC REACTION, INITIAL ENCOUNTER: Primary | ICD-10-CM

## 2025-04-30 DIAGNOSIS — L28.2 PRURITIC RASH: ICD-10-CM

## 2025-04-30 LAB
AMORPH URATE CRY URNS QL MICRO: ABNORMAL /HPF
ANION GAP SERPL CALCULATED.3IONS-SCNC: 15.6 MMOL/L (ref 5–15)
BACTERIA UR QL AUTO: ABNORMAL /HPF
BASOPHILS # BLD AUTO: 0.06 10*3/MM3 (ref 0–0.2)
BASOPHILS NFR BLD AUTO: 0.3 % (ref 0–1.5)
BILIRUB UR QL STRIP: NEGATIVE
BUN SERPL-MCNC: 17 MG/DL (ref 6–20)
BUN/CREAT SERPL: 13.2 (ref 7–25)
CALCIUM SPEC-SCNC: 9.7 MG/DL (ref 8.6–10.5)
CHLORIDE SERPL-SCNC: 103 MMOL/L (ref 98–107)
CLARITY UR: CLEAR
CO2 SERPL-SCNC: 19.4 MMOL/L (ref 22–29)
COLOR UR: ABNORMAL
CREAT SERPL-MCNC: 1.29 MG/DL (ref 0.76–1.27)
DEPRECATED RDW RBC AUTO: 42.8 FL (ref 37–54)
EGFRCR SERPLBLD CKD-EPI 2021: 65.1 ML/MIN/1.73
EOSINOPHIL # BLD AUTO: 0.3 10*3/MM3 (ref 0–0.4)
EOSINOPHIL NFR BLD AUTO: 1.7 % (ref 0.3–6.2)
ERYTHROCYTE [DISTWIDTH] IN BLOOD BY AUTOMATED COUNT: 13.2 % (ref 12.3–15.4)
GEN 5 1HR TROPONIN T REFLEX: 11 NG/L
GLUCOSE BLDC GLUCOMTR-MCNC: 159 MG/DL (ref 70–105)
GLUCOSE SERPL-MCNC: 180 MG/DL (ref 65–99)
GLUCOSE UR STRIP-MCNC: NEGATIVE MG/DL
HCT VFR BLD AUTO: 55.2 % (ref 37.5–51)
HGB BLD-MCNC: 18.5 G/DL (ref 13–17.7)
HGB UR QL STRIP.AUTO: NEGATIVE
HOLD SPECIMEN: NORMAL
HYALINE CASTS UR QL AUTO: ABNORMAL /LPF
IMM GRANULOCYTES # BLD AUTO: 0.06 10*3/MM3 (ref 0–0.05)
IMM GRANULOCYTES NFR BLD AUTO: 0.3 % (ref 0–0.5)
KETONES UR QL STRIP: ABNORMAL
LEUKOCYTE ESTERASE UR QL STRIP.AUTO: ABNORMAL
LYMPHOCYTES # BLD AUTO: 2.27 10*3/MM3 (ref 0.7–3.1)
LYMPHOCYTES NFR BLD AUTO: 13.2 % (ref 19.6–45.3)
MAGNESIUM SERPL-MCNC: 2.1 MG/DL (ref 1.6–2.6)
MCH RBC QN AUTO: 29.7 PG (ref 26.6–33)
MCHC RBC AUTO-ENTMCNC: 33.5 G/DL (ref 31.5–35.7)
MCV RBC AUTO: 88.6 FL (ref 79–97)
MONOCYTES # BLD AUTO: 0.93 10*3/MM3 (ref 0.1–0.9)
MONOCYTES NFR BLD AUTO: 5.4 % (ref 5–12)
MUCOUS THREADS URNS QL MICRO: ABNORMAL /HPF
NEUTROPHILS NFR BLD AUTO: 13.64 10*3/MM3 (ref 1.7–7)
NEUTROPHILS NFR BLD AUTO: 79.1 % (ref 42.7–76)
NITRITE UR QL STRIP: NEGATIVE
NRBC BLD AUTO-RTO: 0 /100 WBC (ref 0–0.2)
NT-PROBNP SERPL-MCNC: <36 PG/ML (ref 0–900)
PH UR STRIP.AUTO: 5.5 [PH] (ref 5–8)
PHOSPHATE SERPL-MCNC: 3.5 MG/DL (ref 2.5–4.5)
PLATELET # BLD AUTO: 310 10*3/MM3 (ref 140–450)
PMV BLD AUTO: 9.9 FL (ref 6–12)
POTASSIUM SERPL-SCNC: 3.9 MMOL/L (ref 3.5–5.2)
PROT UR QL STRIP: ABNORMAL
RBC # BLD AUTO: 6.23 10*6/MM3 (ref 4.14–5.8)
RBC # UR STRIP: ABNORMAL /HPF
REF LAB TEST METHOD: ABNORMAL
SODIUM SERPL-SCNC: 138 MMOL/L (ref 136–145)
SP GR UR STRIP: 1.03 (ref 1–1.03)
SQUAMOUS #/AREA URNS HPF: ABNORMAL /HPF
T4 FREE SERPL-MCNC: 1.08 NG/DL (ref 0.93–1.7)
TROPONIN T NUMERIC DELTA: 4 NG/L
TROPONIN T SERPL HS-MCNC: 7 NG/L
TSH SERPL DL<=0.05 MIU/L-ACNC: 7.98 UIU/ML (ref 0.27–4.2)
UROBILINOGEN UR QL STRIP: ABNORMAL
WBC # UR STRIP: ABNORMAL /HPF
WBC NRBC COR # BLD AUTO: 17.26 10*3/MM3 (ref 3.4–10.8)
WHOLE BLOOD HOLD COAG: NORMAL

## 2025-04-30 PROCEDURE — 85025 COMPLETE CBC W/AUTO DIFF WBC: CPT | Performed by: OCCUPATIONAL THERAPIST

## 2025-04-30 PROCEDURE — 84443 ASSAY THYROID STIM HORMONE: CPT | Performed by: OCCUPATIONAL THERAPIST

## 2025-04-30 PROCEDURE — 84439 ASSAY OF FREE THYROXINE: CPT | Performed by: OCCUPATIONAL THERAPIST

## 2025-04-30 PROCEDURE — 25810000003 SODIUM CHLORIDE 0.9 % SOLUTION: Performed by: OCCUPATIONAL THERAPIST

## 2025-04-30 PROCEDURE — 83735 ASSAY OF MAGNESIUM: CPT | Performed by: OCCUPATIONAL THERAPIST

## 2025-04-30 PROCEDURE — 25010000002 METHYLPREDNISOLONE PER 125 MG: Performed by: EMERGENCY MEDICINE

## 2025-04-30 PROCEDURE — 93005 ELECTROCARDIOGRAM TRACING: CPT | Performed by: EMERGENCY MEDICINE

## 2025-04-30 PROCEDURE — 71045 X-RAY EXAM CHEST 1 VIEW: CPT

## 2025-04-30 PROCEDURE — 84100 ASSAY OF PHOSPHORUS: CPT | Performed by: OCCUPATIONAL THERAPIST

## 2025-04-30 PROCEDURE — 96375 TX/PRO/DX INJ NEW DRUG ADDON: CPT

## 2025-04-30 PROCEDURE — 25010000002 FAMOTIDINE 10 MG/ML SOLUTION: Performed by: OCCUPATIONAL THERAPIST

## 2025-04-30 PROCEDURE — 99284 EMERGENCY DEPT VISIT MOD MDM: CPT

## 2025-04-30 PROCEDURE — 84484 ASSAY OF TROPONIN QUANT: CPT | Performed by: OCCUPATIONAL THERAPIST

## 2025-04-30 PROCEDURE — 81001 URINALYSIS AUTO W/SCOPE: CPT | Performed by: OCCUPATIONAL THERAPIST

## 2025-04-30 PROCEDURE — 96374 THER/PROPH/DIAG INJ IV PUSH: CPT

## 2025-04-30 PROCEDURE — 36415 COLL VENOUS BLD VENIPUNCTURE: CPT

## 2025-04-30 PROCEDURE — 83880 ASSAY OF NATRIURETIC PEPTIDE: CPT | Performed by: OCCUPATIONAL THERAPIST

## 2025-04-30 PROCEDURE — 25010000002 DIPHENHYDRAMINE PER 50 MG: Performed by: EMERGENCY MEDICINE

## 2025-04-30 PROCEDURE — 93005 ELECTROCARDIOGRAM TRACING: CPT

## 2025-04-30 PROCEDURE — 80048 BASIC METABOLIC PNL TOTAL CA: CPT | Performed by: OCCUPATIONAL THERAPIST

## 2025-04-30 PROCEDURE — 82948 REAGENT STRIP/BLOOD GLUCOSE: CPT

## 2025-04-30 RX ORDER — METHYLPREDNISOLONE 4 MG/1
TABLET ORAL
Qty: 21 TABLET | Refills: 0 | Status: SHIPPED | OUTPATIENT
Start: 2025-04-30

## 2025-04-30 RX ORDER — METHYLPREDNISOLONE SODIUM SUCCINATE 125 MG/2ML
125 INJECTION, POWDER, LYOPHILIZED, FOR SOLUTION INTRAMUSCULAR; INTRAVENOUS ONCE
Status: COMPLETED | OUTPATIENT
Start: 2025-04-30 | End: 2025-04-30

## 2025-04-30 RX ORDER — DIPHENHYDRAMINE HCL 50 MG
50 CAPSULE ORAL EVERY 6 HOURS PRN
Qty: 10 CAPSULE | Refills: 0 | Status: SHIPPED | OUTPATIENT
Start: 2025-04-30

## 2025-04-30 RX ORDER — DIPHENHYDRAMINE HYDROCHLORIDE 50 MG/ML
50 INJECTION, SOLUTION INTRAMUSCULAR; INTRAVENOUS ONCE
Status: COMPLETED | OUTPATIENT
Start: 2025-04-30 | End: 2025-04-30

## 2025-04-30 RX ORDER — FAMOTIDINE 40 MG/1
40 TABLET, FILM COATED ORAL 2 TIMES DAILY PRN
Qty: 15 TABLET | Refills: 0 | Status: SHIPPED | OUTPATIENT
Start: 2025-04-30

## 2025-04-30 RX ORDER — CETIRIZINE HYDROCHLORIDE 10 MG/1
10 TABLET ORAL DAILY
Qty: 15 TABLET | Refills: 0 | Status: SHIPPED | OUTPATIENT
Start: 2025-04-30

## 2025-04-30 RX ORDER — FAMOTIDINE 10 MG/ML
20 INJECTION, SOLUTION INTRAVENOUS ONCE
Status: COMPLETED | OUTPATIENT
Start: 2025-04-30 | End: 2025-04-30

## 2025-04-30 RX ORDER — EPINEPHRINE 0.3 MG/.3ML
0.3 INJECTION SUBCUTANEOUS ONCE
Qty: 2 EACH | Refills: 0 | Status: SHIPPED | OUTPATIENT
Start: 2025-04-30 | End: 2025-05-02

## 2025-04-30 RX ADMIN — SODIUM CHLORIDE 1000 ML: 9 INJECTION, SOLUTION INTRAVENOUS at 15:01

## 2025-04-30 RX ADMIN — METHYLPREDNISOLONE SODIUM SUCCINATE 125 MG: 125 INJECTION, POWDER, FOR SOLUTION INTRAMUSCULAR; INTRAVENOUS at 14:23

## 2025-04-30 RX ADMIN — FAMOTIDINE 20 MG: 10 INJECTION, SOLUTION INTRAVENOUS at 15:36

## 2025-04-30 RX ADMIN — DIPHENHYDRAMINE HYDROCHLORIDE 50 MG: 50 INJECTION, SOLUTION INTRAMUSCULAR; INTRAVENOUS at 14:22

## 2025-04-30 NOTE — DISCHARGE INSTRUCTIONS
Follow-up with ENT and allergy for allergy testing.    Take medications as prescribed.    Return to the ER with new or worsening symptoms.

## 2025-04-30 NOTE — ED PROVIDER NOTES
Subjective   History of Present Illness  Patient is a 54-year-old male with past medical history significant for diabetes and hypertension presenting to the ED for evaluation of pruritic rash and near syncope about 2 hours prior to arrival.  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  Patient was still not feeling right, so his wife took manual blood pressure which was 58/34.  He had another episode of tunnel vision during this event after which subsided.  Patient reports that the hives started on his legs and then crawled up his body.  He denies any recent changes in soap or other toiletries, pets in the home, or medications.  Patient states that the rash is pruritic.  He has never had a like this before.  He does not think he has had any insect bites.  Patient reports that he has worked [employer] for some time and is outdoors a lot and has never had this kind of reaction.  Patient denies drugs and alcohol use.  He does not have any significant cardiac or pulmonary history.  He is not experiencing any gait changes, visual changes, dysarthria, headache, facial drooping, or difficulty with coordination.  No chest pain, shortness of breath, stridor, or wheezing.        Review of Systems   Constitutional:  Negative for activity change.   Eyes:  Negative for pain and visual disturbance.   Respiratory:  Negative for cough, chest tightness, shortness of breath, wheezing and stridor.    Cardiovascular:  Negative for chest pain.       Past Medical History:   Diagnosis Date    Cancer 2023    Basal on nose    Colon polyp 08/20/21    4 non-cancerous    Diabetes mellitus 2016    Erectile dysfunction 2019    viagra and tadalafil  gives HA    GERD (gastroesophageal reflux disease)     Hypertension 2016    Amlodipine    Substance abuse 1990    clean for almost 6 months       Allergies   Allergen  "Reactions    Farxiga [Dapagliflozin] Other (See Comments)     Nocturia      Lisinopril Unknown - Low Severity       Past Surgical History:   Procedure Laterality Date    APPENDECTOMY  1978    COLONOSCOPY N/A 6/30/2021    Procedure: COLONOSCOPY with polypectomy X4;  Surgeon: Evin Winn MD;  Location: [facility] ENDOSCOPY;  Service: Gastroenterology;  Laterality: N/A;  Post: polyps    ENDOSCOPY N/A 1/16/2025    Procedure: ESOPHAGOGASTRODUODENOSCOPY WITH FOREIGN BODY REMOVAL AND COLD FORCEP BIOPSY X1 AREA;  Surgeon: Yair Hernandez MD;  Location: [facility] ENDOSCOPY;  Service: Gastroenterology;  Laterality: N/A;  POST- FOOD BOLUS REMOVAL, GE JUNCTION STRICTURE       Family History   Problem Relation Age of Onset    Pulmonary embolism Mother     Obesity Mother         Morbid. Reportedsly around 400 lbs    Diabetes Mother     Hypertension Mother     Skin cancer Father     Heart disease Father         Quadruple bipass    Hypertension Father     Cancer Father         skin, mesothelioma    Hypertension Sister     Alcohol abuse Sister     Depression Sister     Hypertension Sister     Alcohol abuse Sister        Social History     Socioeconomic History    Marital status:    Tobacco Use    Smoking status: Never    Smokeless tobacco: Never   Vaping Use    Vaping status: Never Used   Substance and Sexual Activity    Alcohol use: Not Currently     Comment: Have had several relapses. Last one Dec. 2023    Drug use: Not Currently     Types: Amphetamines, Benzodiazepines, \"Crack\" cocaine, Cocaine(coke), Codeine, Heroin, Hydrocodone, Hydromorphone, Marijuana, Methamphetamines, Morphine, Oxycodone     Comment: Alcohol    Sexual activity: Not Currently     Partners: Female     Birth control/protection: Abstinence     Comment: erectile dysfunction           Objective   Physical Exam  Vitals and nursing note reviewed.   Constitutional:       General: He is not in acute distress.     Appearance: Normal appearance. He is not " ill-appearing, toxic-appearing or diaphoretic.   HENT:      Head: Normocephalic and atraumatic.      Right Ear: Tympanic membrane, ear canal and external ear normal.      Left Ear: Tympanic membrane, ear canal and external ear normal.      Nose: Nose normal.      Mouth/Throat:      Mouth: Mucous membranes are moist.      Tongue: No lesions. Tongue does not deviate from midline.      Pharynx: Oropharynx is clear. No pharyngeal swelling, oropharyngeal exudate, posterior oropharyngeal erythema or uvula swelling.   Eyes:      General: No scleral icterus.        Right eye: No discharge.         Left eye: No discharge.      Extraocular Movements: Extraocular movements intact.      Conjunctiva/sclera: Conjunctivae normal.      Pupils: Pupils are equal, round, and reactive to light.   Cardiovascular:      Rate and Rhythm: Normal rate and regular rhythm.      Pulses: Normal pulses.      Heart sounds: Normal heart sounds. No murmur heard.     No friction rub. No gallop.   Pulmonary:      Effort: Pulmonary effort is normal. No respiratory distress.      Breath sounds: Normal breath sounds. No stridor. No wheezing, rhonchi or rales.   Abdominal:      General: Bowel sounds are normal.      Palpations: Abdomen is soft.      Tenderness: There is no guarding.   Musculoskeletal:         General: Normal range of motion.      Cervical back: Normal range of motion and neck supple. No rigidity or tenderness.      Right lower leg: No edema.      Left lower leg: No edema.   Lymphadenopathy:      Cervical: No cervical adenopathy.   Skin:     General: Skin is warm and dry.      Capillary Refill: Capillary refill takes 2 to 3 seconds.      Findings: Rash present. Rash is urticarial.      Comments: Urticarial rash present over full body, worse on extremities       Neurological:      General: No focal deficit present.      Mental Status: He is alert.      Sensory: No sensory deficit.      Motor: No weakness.      Coordination: Coordination  normal.      Gait: Gait normal.   Psychiatric:         Mood and Affect: Mood normal.         Behavior: Behavior normal.         Procedures           ED Course      Labs Reviewed   BASIC METABOLIC PANEL - Abnormal; Notable for the following components:       Result Value    Glucose 180 (*)     Creatinine 1.29 (*)     CO2 19.4 (*)     Anion Gap 15.6 (*)     All other components within normal limits    Narrative:     GFR Categories in Chronic Kidney Disease (CKD)      GFR Category          GFR (mL/min/1.73)    Interpretation  G1                     90 or greater         Normal or high (1)  G2                      60-89                Mild decrease (1)  G3a                   45-59                Mild to moderate decrease  G3b                   30-44                Moderate to severe decrease  G4                    15-29                Severe decrease  G5                    14 or less           Kidney failure          (1)In the absence of evidence of kidney disease, neither GFR category G1 or G2 fulfill the criteria for CKD.    eGFR calculation 2021 CKD-EPI creatinine equation, which does not include race as a factor   URINALYSIS W/ CULTURE IF INDICATED - Abnormal; Notable for the following components:    Color, UA Dark Yellow (*)     Specific Gravity, UA 1.033 (*)     Ketones, UA 15 mg/dL (1+) (*)     Protein, UA Trace (*)     Leuk Esterase, UA Trace (*)     All other components within normal limits    Narrative:     In absence of clinical symptoms, the presence of pyuria, bacteria, and/or nitrites on the urinalysis result does not correlate with infection.   TSH RFX ON ABNORMAL TO FREE T4 - Abnormal; Notable for the following components:    TSH 7.980 (*)     All other components within normal limits   CBC WITH AUTO DIFFERENTIAL - Abnormal; Notable for the following components:    WBC 17.26 (*)     RBC 6.23 (*)     Hemoglobin 18.5 (*)     Hematocrit 55.2 (*)     Neutrophil % 79.1 (*)     Lymphocyte % 13.2 (*)      Neutrophils, Absolute 13.64 (*)     Monocytes, Absolute 0.93 (*)     Immature Grans, Absolute 0.06 (*)     All other components within normal limits   URINALYSIS, MICROSCOPIC ONLY - Abnormal; Notable for the following components:    WBC, UA 3-5 (*)     Bacteria, UA Trace (*)     Mucus, UA Small/1+ (*)     All other components within normal limits   HIGH SENSITIVITIY TROPONIN T 1HR - Abnormal; Notable for the following components:    Troponin T Numeric Delta 4 (*)     All other components within normal limits    Narrative:     High Sensitive Troponin T Reference Range:  <14.0 ng/L- Negative Female for AMI  <22.0 ng/L- Negative Male for AMI  >=14 - Abnormal Female indicating possible myocardial injury.  >=22 - Abnormal Male indicating possible myocardial injury.   Clinicians would have to utilize clinical acumen, EKG, Troponin, and serial changes to determine if it is an Acute Myocardial Infarction or myocardial injury due to an underlying chronic condition.        POCT GLUCOSE FINGERSTICK - Abnormal; Notable for the following components:    Glucose 159 (*)     All other components within normal limits   BNP (IN-HOUSE) - Normal    Narrative:     This assay is used as an aid in the diagnosis of individuals suspected of having heart failure. It can be used as an aid in the diagnosis of acute decompensated heart failure (ADHF) in patients presenting with signs and symptoms of ADHF to the emergency department (ED). In addition, NT-proBNP of <300 pg/mL indicates ADHF is not likely.    Age Range Result Interpretation  NT-proBNP Concentration (pg/mL:      <50             Positive            >450                   Gray                 300-450                    Negative             <300    50-75           Positive            >900                  Gray                300-900                  Negative            <300      >75             Positive            >1800                  Gray                300-1800                   Negative            <300   TROPONIN - Normal    Narrative:     High Sensitive Troponin T Reference Range:  <14.0 ng/L- Negative Female for AMI  <22.0 ng/L- Negative Male for AMI  >=14 - Abnormal Female indicating possible myocardial injury.  >=22 - Abnormal Male indicating possible myocardial injury.   Clinicians would have to utilize clinical acumen, EKG, Troponin, and serial changes to determine if it is an Acute Myocardial Infarction or myocardial injury due to an underlying chronic condition.        MAGNESIUM - Normal   PHOSPHORUS - Normal   T4, FREE - Normal   CBC AND DIFFERENTIAL    Narrative:     The following orders were created for panel order CBC & Differential.  Procedure                               Abnormality         Status                     ---------                               -----------         ------                     CBC Auto Differential[[id number]]        Abnormal            Final result                 Please view results for these tests on the individual orders.   EXTRA TUBES    Narrative:     The following orders were created for panel order Extra Tubes.  Procedure                               Abnormality         Status                     ---------                               -----------         ------                     Gold Top - SST[[id number]]                                   Final result               Light Blue Top[[id number]]                                   Final result                 Please view results for these tests on the individual orders.   GOLD TOP - SST   LIGHT BLUE TOP     XR Chest 1 View  Result Date: 4/30/2025  Impression: No acute cardiopulmonary finding. Small esophageal hiatal hernia. Electronically Signed: Mia Mary MD  4/30/2025 2:50 PM EDT  Workstation ID: [id number]    Medications   methylPREDNISolone sodium succinate (SOLU-Medrol) injection 125 mg (125 mg Intravenous Given 4/30/25 1423)   diphenhydrAMINE (BENADRYL) injection 50 mg (50 mg Intravenous  Given 4/30/25 1422)   sodium chloride 0.9 % bolus 1,000 mL (0 mL Intravenous Stopped 4/30/25 1651)   famotidine (PEPCID) injection 20 mg (20 mg Intravenous Given 4/30/25 1536)                                                      Medical Decision Making  Patient is a 54-year-old male who presented with the above complaint.  He had the above evaluation and exam.  Patient was placed on the monitor and IV was established.    Imaging independently interpreted by radiology and reviewed by myself.  Chest x-ray was negative for acute abnormality but did find a small esophageal hiatal hernia.    EKG independently interpreted by Dr. Gamez and reviewed by myself.  Sinus rhythm, rate 72, QT interval 398.  No prior for comparison.    Labs significant for elevated TSH, 1.29 creatinine with 15.6 anion gap.  Glucose 180.  Trace bacteria on urinalysis with 35 white cells.  Patient is asymptomatic.  White count 17.26 with 18.5 hemoglobin.  Platelets normal.    Patient given IV Pepcid, Solu-Medrol, and Benadryl in the ED as well as IV fluids.  At reassessment, he is resting comfortably in no acute distress.  He is hemodynamically stable and afebrile.  He is not experiencing any chest pain, shortness of breath, abdominal pain, or other anginal equivalents.  Rash significantly improved, and he is experiencing no respiratory symptoms.  Patient's troponin delta was 4, so placement in observation was recommended.  Patient chose to leave AMA.  Prior to his departure, prescriptions were ordered for EpiPen, Pepcid, Benadryl, Zyrtec, and Medrol Dosepak.  Return indications were provided, as well as instructions for administration of EpiPen.  Patient verbalized agreement understanding.    Problems Addressed:  Allergic reaction, initial encounter: acute illness or injury  Elevated troponin: acute illness or injury  Pruritic rash: acute illness or injury    Amount and/or Complexity of Data Reviewed  Labs: ordered.  Radiology:  ordered.  ECG/medicine tests: ordered.    Risk  OTC drugs.  Prescription drug management.        Final diagnoses:   Elevated troponin   Allergic reaction, initial encounter   Pruritic rash       ED Disposition  ED Disposition       ED Disposition   AMA    Condition   --    Comment   --               Ruth Owen MD  [address] [facility] [place] [address]  [phone]    Schedule an appointment as soon as possible for a visit       [facility]  [address]  [place] [address]  [phone]  Go to   For walk-in hours         Medication List        New Prescriptions      cetirizine 10 MG tablet  Commonly known as: zyrTEC  Take 1 tablet by mouth Daily.     diphenhydrAMINE 50 MG capsule  Commonly known as: BENADRYL  Take 1 capsule by mouth Every 6 (Six) Hours As Needed for Itching.     EPINEPHrine 0.3 MG/0.3ML solution auto-injector injection  Commonly known as: EPIPEN  Inject 0.3 mL under the skin into the appropriate area as directed 1 (One) Time for 1 dose.     famotidine 40 MG tablet  Commonly known as: PEPCID  Take 1 tablet by mouth 2 (Two) Times a Day As Needed for itchy rash     methylPREDNISolone 4 MG dose pack  Commonly known as: MEDROL  Take as directed on package instructions.               Where to Get Your Medications        These medications were sent to [facility] [address] [place] [address]      Hours: Monday to Friday 7 AM to 7 PM Phone: [phone]   cetirizine 10 MG tablet  diphenhydrAMINE 50 MG capsule  EPINEPHrine 0.3 MG/0.3ML solution auto-injector injection  famotidine 40 MG tablet  methylPREDNISolone 4 MG dose pack            Ofelia Almonte PA-C  04/30/25 [id number]

## 2025-04-30 NOTE — TELEPHONE ENCOUNTER
Please call patient and make sure that the hives are improved and that he has not noted any more signs of hypotension.  Emergency room prescribed and we should recheck him in the office or have him see an allergist over the next week or 2

## 2025-05-01 LAB
QT INTERVAL: 398 MS
QTC INTERVAL: 435 MS

## 2025-05-01 NOTE — TELEPHONE ENCOUNTER
Pt stated he is feeling much better and that his hives are gone. He is due to see his allergists tomorrow.

## 2025-05-03 ENCOUNTER — TRANSCRIBE ORDERS (OUTPATIENT)
Dept: ADMINISTRATIVE | Facility: HOSPITAL | Age: 55
End: 2025-05-03
Payer: COMMERCIAL

## 2025-05-03 ENCOUNTER — LAB (OUTPATIENT)
Dept: LAB | Facility: HOSPITAL | Age: 55
End: 2025-05-03
Payer: COMMERCIAL

## 2025-05-03 DIAGNOSIS — Z91.014 ALPHA-GAL SYNDROME: Primary | ICD-10-CM

## 2025-05-03 DIAGNOSIS — Z91.014 ALPHA-GAL SYNDROME: ICD-10-CM

## 2025-05-03 PROCEDURE — 86003 ALLG SPEC IGE CRUDE XTRC EA: CPT

## 2025-05-03 PROCEDURE — 36415 COLL VENOUS BLD VENIPUNCTURE: CPT

## 2025-05-03 PROCEDURE — 82785 ASSAY OF IGE: CPT

## 2025-05-03 PROCEDURE — 86008 ALLG SPEC IGE RECOMB EA: CPT

## 2025-05-03 PROCEDURE — 82760 ASSAY OF GALACTOSE: CPT

## 2025-05-07 LAB
ALPHA-GAL IGE QN: 41.8 KU/L
ALPHA-GAL IGE QN: 42 KU/L
BEEF IGE QN: 30.2 KU/L
CHICKEN MEAT IGE QN: <0.1 KU/L
CONV CLASS DESCRIPTION: ABNORMAL
IGE SERPL-ACNC: 502 IU/ML (ref 6–495)
LAMB IGE QN: 9.37 KU/L
PORK IGE QN: 16.1 KU/L

## 2025-05-12 RX ORDER — ALLOPURINOL 100 MG/1
100 TABLET ORAL DAILY
Qty: 90 TABLET | Refills: 0 | Status: SHIPPED | OUTPATIENT
Start: 2025-05-12

## 2025-05-13 LAB — GALACTOSE [MASS/VOLUME] IN SERUM OR PLASMA: 1.3 MG/DL

## 2025-05-25 PROBLEM — E66.811 CLASS 1 OBESITY DUE TO EXCESS CALORIES WITH SERIOUS COMORBIDITY AND BODY MASS INDEX (BMI) OF 31.0 TO 31.9 IN ADULT: Status: RESOLVED | Noted: 2021-10-08 | Resolved: 2025-05-25

## 2025-05-25 PROBLEM — E66.09 CLASS 1 OBESITY DUE TO EXCESS CALORIES WITH SERIOUS COMORBIDITY AND BODY MASS INDEX (BMI) OF 31.0 TO 31.9 IN ADULT: Status: RESOLVED | Noted: 2021-10-08 | Resolved: 2025-05-25

## 2025-05-25 PROBLEM — Z91.014 ALPHA-GAL SYNDROME: Status: ACTIVE | Noted: 2025-05-25

## 2025-05-25 NOTE — PATIENT INSTRUCTIONS
Health Maintenance Due   Topic Date Due    DIABETIC EYE EXAM  Never done    Hepatitis B (1 of 3 - 19+ 3-dose series) Never done    TDAP/TD VACCINES (1 - Tdap) Never done    COVID-19 Vaccine (5 - 2024-25 season) 09/01/2024    HEMOGLOBIN A1C  08/25/2025

## 2025-05-26 RX ORDER — EZETIMIBE 10 MG/1
10 TABLET ORAL DAILY
Qty: 90 TABLET | Refills: 3 | Status: SHIPPED | OUTPATIENT
Start: 2025-05-26

## 2025-05-26 RX ORDER — METFORMIN HYDROCHLORIDE EXTENDED-RELEASE TABLETS 500 MG/1
500 TABLET, FILM COATED, EXTENDED RELEASE ORAL
Qty: 90 TABLET | Refills: 1 | Status: SHIPPED | OUTPATIENT
Start: 2025-05-26

## 2025-05-27 ENCOUNTER — OFFICE VISIT (OUTPATIENT)
Dept: FAMILY MEDICINE CLINIC | Facility: CLINIC | Age: 55
End: 2025-05-27
Payer: COMMERCIAL

## 2025-05-27 ENCOUNTER — LAB (OUTPATIENT)
Dept: FAMILY MEDICINE CLINIC | Facility: CLINIC | Age: 55
End: 2025-05-27
Payer: COMMERCIAL

## 2025-05-27 VITALS
HEART RATE: 74 BPM | WEIGHT: 233.4 LBS | SYSTOLIC BLOOD PRESSURE: 114 MMHG | HEIGHT: 72 IN | BODY MASS INDEX: 31.61 KG/M2 | OXYGEN SATURATION: 98 % | DIASTOLIC BLOOD PRESSURE: 82 MMHG | TEMPERATURE: 98 F

## 2025-05-27 DIAGNOSIS — E66.811 CLASS 1 OBESITY DUE TO EXCESS CALORIES WITH SERIOUS COMORBIDITY AND BODY MASS INDEX (BMI) OF 31.0 TO 31.9 IN ADULT: ICD-10-CM

## 2025-05-27 DIAGNOSIS — E11.43 TYPE 2 DIABETES MELLITUS WITH DIABETIC AUTONOMIC NEUROPATHY, WITHOUT LONG-TERM CURRENT USE OF INSULIN: Primary | ICD-10-CM

## 2025-05-27 DIAGNOSIS — F19.10 SUBSTANCE ABUSE: ICD-10-CM

## 2025-05-27 DIAGNOSIS — I15.9 SECONDARY HYPERTENSION: ICD-10-CM

## 2025-05-27 DIAGNOSIS — E78.49 OTHER HYPERLIPIDEMIA: ICD-10-CM

## 2025-05-27 DIAGNOSIS — E66.09 CLASS 1 OBESITY DUE TO EXCESS CALORIES WITH SERIOUS COMORBIDITY AND BODY MASS INDEX (BMI) OF 31.0 TO 31.9 IN ADULT: ICD-10-CM

## 2025-05-27 DIAGNOSIS — K21.9 GASTROESOPHAGEAL REFLUX DISEASE, UNSPECIFIED WHETHER ESOPHAGITIS PRESENT: ICD-10-CM

## 2025-05-27 DIAGNOSIS — M1A.0790 CHRONIC GOUT OF FOOT, UNSPECIFIED CAUSE, UNSPECIFIED LATERALITY: ICD-10-CM

## 2025-05-27 DIAGNOSIS — E11.43 TYPE 2 DIABETES MELLITUS WITH DIABETIC AUTONOMIC NEUROPATHY, WITHOUT LONG-TERM CURRENT USE OF INSULIN: ICD-10-CM

## 2025-05-27 LAB
ALBUMIN SERPL-MCNC: 4.5 G/DL (ref 3.5–5.2)
ALBUMIN/GLOB SERPL: 1.4 G/DL
ALP SERPL-CCNC: 133 U/L (ref 39–117)
ALT SERPL W P-5'-P-CCNC: 29 U/L (ref 1–41)
ANION GAP SERPL CALCULATED.3IONS-SCNC: 8.6 MMOL/L (ref 5–15)
AST SERPL-CCNC: 31 U/L (ref 1–40)
BASOPHILS # BLD AUTO: 0.07 10*3/MM3 (ref 0–0.2)
BASOPHILS NFR BLD AUTO: 1 % (ref 0–1.5)
BILIRUB SERPL-MCNC: 0.4 MG/DL (ref 0–1.2)
BUN SERPL-MCNC: 10 MG/DL (ref 6–20)
BUN/CREAT SERPL: 11.5 (ref 7–25)
CALCIUM SPEC-SCNC: 9.8 MG/DL (ref 8.6–10.5)
CHLORIDE SERPL-SCNC: 103 MMOL/L (ref 98–107)
CHOLEST SERPL-MCNC: 159 MG/DL (ref 0–200)
CO2 SERPL-SCNC: 25.4 MMOL/L (ref 22–29)
CREAT SERPL-MCNC: 0.87 MG/DL (ref 0.76–1.27)
DEPRECATED RDW RBC AUTO: 43.5 FL (ref 37–54)
EGFRCR SERPLBLD CKD-EPI 2021: 101.3 ML/MIN/1.73
EOSINOPHIL # BLD AUTO: 0.42 10*3/MM3 (ref 0–0.4)
EOSINOPHIL NFR BLD AUTO: 6 % (ref 0.3–6.2)
ERYTHROCYTE [DISTWIDTH] IN BLOOD BY AUTOMATED COUNT: 12.8 % (ref 12.3–15.4)
GLOBULIN UR ELPH-MCNC: 3.3 GM/DL
GLUCOSE SERPL-MCNC: 109 MG/DL (ref 65–99)
HBA1C MFR BLD: 6 % (ref 4.8–5.6)
HCT VFR BLD AUTO: 49.9 % (ref 37.5–51)
HDLC SERPL-MCNC: 48 MG/DL (ref 40–60)
HGB BLD-MCNC: 16.2 G/DL (ref 13–17.7)
IMM GRANULOCYTES # BLD AUTO: 0.01 10*3/MM3 (ref 0–0.05)
IMM GRANULOCYTES NFR BLD AUTO: 0.1 % (ref 0–0.5)
LDLC SERPL CALC-MCNC: 90 MG/DL (ref 0–100)
LDLC/HDLC SERPL: 1.82 {RATIO}
LYMPHOCYTES # BLD AUTO: 1.98 10*3/MM3 (ref 0.7–3.1)
LYMPHOCYTES NFR BLD AUTO: 28.3 % (ref 19.6–45.3)
MAGNESIUM SERPL-MCNC: 2.1 MG/DL (ref 1.6–2.6)
MCH RBC QN AUTO: 30.3 PG (ref 26.6–33)
MCHC RBC AUTO-ENTMCNC: 32.5 G/DL (ref 31.5–35.7)
MCV RBC AUTO: 93.3 FL (ref 79–97)
MONOCYTES # BLD AUTO: 0.72 10*3/MM3 (ref 0.1–0.9)
MONOCYTES NFR BLD AUTO: 10.3 % (ref 5–12)
NEUTROPHILS NFR BLD AUTO: 3.79 10*3/MM3 (ref 1.7–7)
NEUTROPHILS NFR BLD AUTO: 54.3 % (ref 42.7–76)
NRBC BLD AUTO-RTO: 0 /100 WBC (ref 0–0.2)
PLATELET # BLD AUTO: 302 10*3/MM3 (ref 140–450)
PMV BLD AUTO: 11 FL (ref 6–12)
POTASSIUM SERPL-SCNC: 4.1 MMOL/L (ref 3.5–5.2)
PROT SERPL-MCNC: 7.8 G/DL (ref 6–8.5)
RBC # BLD AUTO: 5.35 10*6/MM3 (ref 4.14–5.8)
SODIUM SERPL-SCNC: 137 MMOL/L (ref 136–145)
T4 FREE SERPL-MCNC: 1 NG/DL (ref 0.92–1.68)
TRIGL SERPL-MCNC: 118 MG/DL (ref 0–150)
TSH SERPL DL<=0.05 MIU/L-ACNC: 5.4 UIU/ML (ref 0.27–4.2)
URATE SERPL-MCNC: 5.5 MG/DL (ref 3.4–7)
VIT B12 BLD-MCNC: 983 PG/ML (ref 211–946)
VLDLC SERPL-MCNC: 21 MG/DL (ref 5–40)
WBC NRBC COR # BLD AUTO: 6.99 10*3/MM3 (ref 3.4–10.8)

## 2025-05-27 PROCEDURE — 84439 ASSAY OF FREE THYROXINE: CPT | Performed by: PREVENTIVE MEDICINE

## 2025-05-27 PROCEDURE — 80061 LIPID PANEL: CPT | Performed by: PREVENTIVE MEDICINE

## 2025-05-27 PROCEDURE — 82607 VITAMIN B-12: CPT | Performed by: PREVENTIVE MEDICINE

## 2025-05-27 PROCEDURE — 82570 ASSAY OF URINE CREATININE: CPT | Performed by: PREVENTIVE MEDICINE

## 2025-05-27 PROCEDURE — 99214 OFFICE O/P EST MOD 30 MIN: CPT | Performed by: PREVENTIVE MEDICINE

## 2025-05-27 PROCEDURE — 84550 ASSAY OF BLOOD/URIC ACID: CPT | Performed by: PREVENTIVE MEDICINE

## 2025-05-27 PROCEDURE — 83036 HEMOGLOBIN GLYCOSYLATED A1C: CPT | Performed by: PREVENTIVE MEDICINE

## 2025-05-27 PROCEDURE — 80050 GENERAL HEALTH PANEL: CPT | Performed by: PREVENTIVE MEDICINE

## 2025-05-27 PROCEDURE — 82043 UR ALBUMIN QUANTITATIVE: CPT | Performed by: PREVENTIVE MEDICINE

## 2025-05-27 PROCEDURE — 83735 ASSAY OF MAGNESIUM: CPT | Performed by: PREVENTIVE MEDICINE

## 2025-05-27 PROCEDURE — 36415 COLL VENOUS BLD VENIPUNCTURE: CPT

## 2025-05-27 RX ORDER — EPINEPHRINE 0.3 MG/.3ML
INJECTION SUBCUTANEOUS
COMMUNITY
Start: 2025-05-15

## 2025-05-27 NOTE — ASSESSMENT & PLAN NOTE
Patient's (Body mass index is 31.65 kg/m².) indicates that they are obese (BMI >30) with health conditions that include diabetes mellitus, dyslipidemias, and GERD . Weight is unchanged. BMI  is above average; BMI management plan is completed. We discussed portion control and increasing exercise.   
no

## 2025-05-27 NOTE — PROGRESS NOTES
Brenda Zelaya is a 54 y.o. male presents for   Chief Complaint   Patient presents with    Primary Care Follow-Up     Is fasting    Diabetes       Health Maintenance Due   Topic Date Due    DIABETIC EYE EXAM  Never done    Hepatitis B (1 of 3 - 19+ 3-dose series) Never done    TDAP/TD VACCINES (1 - Tdap) Never done    COVID-19 Vaccine (5 - 2024-25 season) 09/01/2024    HEMOGLOBIN A1C  08/25/2025       Primary Care Follow-UpPertinent negatives include no chest pain, no nausea, no fatigue, no weakness, no neck pain, no myalgias, no abdominal pain, no cough, no sore throat and no diaphoresis.   Diabetes  Associated symptoms:     no chest pain, no fatigue and no weakness       History of Present Illness  The patient is a 54-year-old male who is here today for type 2 diabetes, GERD, chronic gout of the foot, hyperlipidemia, secondary hypertension, class I obesity due to excess calories, and substance use disorder.    He reports no episodes of hypoglycemia or hyperglycemia, with blood glucose levels consistently within the range of 100 to 200. His ophthalmological examinations are current, and he has transitioned to daily wear contact lenses, which he finds satisfactory. He has an upcoming dental appointment for routine cleaning.    He has been adhering to a specific diet for the past 15 months, with minimal deviations. However, he experienced an adverse reaction after consuming a double cheeseburger, which included symptoms of hives and presyncope. He also reported initial gastrointestinal discomfort, which he attributed to dietary changes. He has not yet incorporated pork into his diet. His allergist informed him that his beef allergy level was 42, while his pork allergy level was significantly lower. He has not yet tried pork. He has been using EpiPens for his allergies.    He has gained 2 pounds since his last visit but notes a weight loss of 5 pounds over the past month when measured at home without  "clothes. His current weight is 227 to 228 pounds. He continues to lose weight and maintains a stable diet. He reports no difficulty swallowing, indigestion, palpitations, hemoptysis, sputum production, wheezing, chest pressure, constipation, diarrhea, dysuria, hematuria, or sexual dysfunction. He has experienced intentional weight loss.    He reports no recent episodes of gout.    He has abstained from alcohol consumption for the past 2 months.    SOCIAL HISTORY  He reports no alcohol intake in the past 2 months.    Vitals:    05/27/25 0818 05/27/25 0821   BP: 129/85 114/82   BP Location: Left arm Right arm   Patient Position: Sitting Sitting   Cuff Size: Large Adult Large Adult   Pulse: 73 74   Temp: 98 °F (36.7 °C)    TempSrc: Infrared    SpO2: 98%    Weight: 106 kg (233 lb 6.4 oz)    Height: 182.9 cm (72.01\")      Body mass index is 31.65 kg/m².    Current Outpatient Medications on File Prior to Visit   Medication Sig Dispense Refill    allopurinol (ZYLOPRIM) 100 MG tablet Take 1 tablet by mouth once daily 90 tablet 0    amLODIPine (NORVASC) 10 MG tablet Take 1 tablet by mouth once daily 90 tablet 0    aspirin 81 MG oral suspension Take 81 mL by mouth 1 (One) Time.      cetirizine (zyrTEC) 10 MG tablet Take 1 tablet by mouth Daily. 15 tablet 0    desmopressin (DDAVP) 0.2 MG tablet Take  by mouth every night at bedtime.      diphenhydrAMINE (BENADRYL) 50 MG capsule Take 1 capsule by mouth Every 6 (Six) Hours As Needed for Itching. 10 capsule 0    EPINEPHrine (EPIPEN) 0.3 MG/0.3ML solution auto-injector injection INJECT CONTENTS OF 1 PEN AS NEEDED FOR ALLERGIC REACTION IN THE MUSCLE      ezetimibe (Zetia) 10 MG tablet Take 1 tablet by mouth Daily. 90 tablet 3    famotidine (PEPCID) 40 MG tablet Take 1 tablet by mouth 2 (Two) Times a Day As Needed for itchy rash 15 tablet 0    gabapentin (NEURONTIN) 600 MG tablet Take 1 tablet by mouth 3 (Three) Times a Day. 270 tablet 0    metFORMIN (FORTAMET) 500 MG (OSM) 24 hr " tablet Take 1 tablet by mouth Daily With Breakfast. 90 tablet 1    methylPREDNISolone (MEDROL) 4 MG dose pack Take as directed on package instructions. 21 tablet 0    multivitamin (THERAGRAN) tablet tablet       omeprazole (priLOSEC) 40 MG capsule Take 1 capsule by mouth Daily. 90 capsule 1    pravastatin (PRAVACHOL) 80 MG tablet Take 1 tablet by mouth Daily. 90 tablet 3     No current facility-administered medications on file prior to visit.       The following portions of the patient's history were reviewed and updated as appropriate: allergies, current medications, past family history, past medical history, past social history, past surgical history, and problem list.    Review of Systems   Constitutional:  Negative for chills, diaphoresis, fatigue and fever.   HENT:  Negative for congestion, sore throat and swollen glands.    Respiratory:  Negative for cough.    Cardiovascular:  Negative for chest pain.   Gastrointestinal:  Negative for abdominal pain, nausea and vomiting.   Genitourinary:  Negative for dysuria.   Musculoskeletal:  Negative for myalgias and neck pain.   Skin:  Negative for rash.   Neurological:  Negative for weakness and numbness.       Objective   Physical Exam  Vitals reviewed.   Constitutional:       General: He is not in acute distress.     Appearance: Normal appearance. He is well-developed. He is not ill-appearing or toxic-appearing.   HENT:      Head: Normocephalic and atraumatic.      Right Ear: Tympanic membrane, ear canal and external ear normal.      Left Ear: Tympanic membrane, ear canal and external ear normal.      Nose: Nose normal.   Eyes:      Extraocular Movements: Extraocular movements intact.      Conjunctiva/sclera: Conjunctivae normal.      Pupils: Pupils are equal, round, and reactive to light.   Cardiovascular:      Rate and Rhythm: Normal rate and regular rhythm.      Pulses:           Dorsalis pedis pulses are 1+ on the right side and 1+ on the left side.         Posterior tibial pulses are 1+ on the right side and 1+ on the left side.      Heart sounds: Normal heart sounds.   Pulmonary:      Effort: Pulmonary effort is normal.      Breath sounds: Normal breath sounds.   Abdominal:      General: Bowel sounds are normal. There is no distension.      Palpations: Abdomen is soft. There is no mass.      Tenderness: There is no abdominal tenderness.   Musculoskeletal:         General: Normal range of motion.      Cervical back: Neck supple.   Feet:      Right foot:      Protective Sensation: 10 sites tested.  10 sites sensed.      Skin integrity: Skin integrity normal.      Toenail Condition: Right toenails are normal.      Left foot:      Protective Sensation: 10 sites tested.  10 sites sensed.      Skin integrity: Skin integrity normal.      Toenail Condition: Left toenails are normal.      Comments: Diabetic Foot Exam Performed and Monofilament Test Performed    Skin:     General: Skin is warm.   Neurological:      General: No focal deficit present.      Mental Status: He is alert and oriented to person, place, and time.   Psychiatric:         Mood and Affect: Mood normal.         Behavior: Behavior normal.       Physical Exam  Mouth/Throat: Mucous membranes moist, no erythema, no exudate  Neck: Supple, no abnormalities  Respiratory: Clear to auscultation, no wheezing, rales or rhonchi  Cardiovascular: Regular rate and rhythm, no murmurs, rubs, or gallops  Extremities: No edema, no cyanosis    PHQ-9 Total Score:    Results  Labs   - IgE RAST test for beef allergy: 42         Assessment & Plan   Diagnoses and all orders for this visit:    1. Type 2 diabetes mellitus with diabetic autonomic neuropathy, without long-term current use of insulin (Primary)  -     Comprehensive Metabolic Panel; Future  -     Hemoglobin A1c; Future  -     Microalbumin / Creatinine Urine Ratio - Urine, Clean Catch; Future  -     TSH Rfx On Abnormal To Free T4; Future  -     Vitamin B12; Future    2.  Class 1 obesity due to excess calories with serious comorbidity and body mass index (BMI) of 31.0 to 31.9 in adult  Assessment & Plan:  Patient's (Body mass index is 31.65 kg/m².) indicates that they are obese (BMI >30) with health conditions that include diabetes mellitus, dyslipidemias, and GERD . Weight is unchanged. BMI  is above average; BMI management plan is completed. We discussed portion control and increasing exercise.       3. Substance abuse    4. Gastroesophageal reflux disease, unspecified whether esophagitis present  -     CBC Auto Differential; Future  -     Magnesium; Future    5. Chronic gout of foot, unspecified cause, unspecified laterality  -     Uric Acid; Future    6. Other hyperlipidemia  -     Lipid Panel; Future    7. Secondary hypertension      Assessment & Plan  1. Type 2 Diabetes Mellitus.  - Blood glucose levels have been stable, with no readings below 100 or above 200.  - Physical exam findings include normal heart rate and rhythm.  - Advised to continue current diabetes management plan.  - Advised to complete lab work within the next few weeks.    2. Gastroesophageal Reflux Disease (GERD).  - Reports no current symptoms of heartburn or indigestion.  - Physical exam findings include no trouble with swallowing or indigestion.  - Advised to continue avoiding foods that trigger symptoms.  - No new medications or therapies prescribed.    3. Chronic Gout of the Foot.  - Reports no recent gout flare-ups.  - Physical exam findings include normal sensation in the feet.  - Advised to continue current gout management plan.  - No new medications or therapies prescribed.    4. Hyperlipidemia.  - Monitoring saturated fat intake.  - Physical exam findings include normal heart rate and rhythm.  - Advised to continue current diet and medication regimen.  - No new medications or therapies prescribed.    5. Secondary Hypertension.  - Blood pressure readings have been within the normal range.  - Physical  exam findings include normal heart rate and rhythm.  - Advised to continue current antihypertensive medication regimen.  - No new medications or therapies prescribed.    6. Class I Obesity due to Excess Calories.  - Actively losing weight, reports a current weight of 227-228 pounds.  - Physical exam findings include normal heart rate and rhythm.  - Advised to continue current diet and exercise regimen.  - No new medications or therapies prescribed.    7. Substance Abuse.  - Reports no recent substance abuse and has been abstaining from alcohol.  - Physical exam findings include normal heart rate and rhythm.  - Encouraged to continue sobriety.  - No new medications or therapies prescribed.    8. Alpha-gal Syndrome.  - Experienced a severe allergic reaction after consuming a double cheeseburger containing beef.  - Physical exam findings include normal heart rate and rhythm.  - Advised to avoid beef and other mammalian meat products, use Promethium spray as a tick repellent, and check for ticks regularly.  - Advised to carry an EpiPen for emergency use.    Follow-up  The patient will follow up in 6 months.    Patient Instructions     Health Maintenance Due   Topic Date Due    DIABETIC EYE EXAM  Never done    Hepatitis B (1 of 3 - 19+ 3-dose series) Never done    TDAP/TD VACCINES (1 - Tdap) Never done    COVID-19 Vaccine (5 - 2024-25 season) 09/01/2024    HEMOGLOBIN A1C  08/25/2025           Patient or patient representative verbalized consent for the use of Ambient Listening during the visit with  Ruth Owen MD for chart documentation. 5/27/2025  10:33 EDT

## 2025-05-28 ENCOUNTER — RESULTS FOLLOW-UP (OUTPATIENT)
Dept: FAMILY MEDICINE CLINIC | Facility: CLINIC | Age: 55
End: 2025-05-28
Payer: COMMERCIAL

## 2025-05-28 LAB
ALBUMIN UR-MCNC: <1.2 MG/DL
CREAT UR-MCNC: 39.9 MG/DL
MICROALBUMIN/CREAT UR: NORMAL MG/G{CREAT}

## 2025-05-29 NOTE — TELEPHONE ENCOUNTER
Pt read lab results via [facility]      CBC Auto Differential; Hemoglobin A1c; TSH Rfx On Abnormal To Free T4; Uric Acid; Comprehensive Metabolic Panel (5 more orders)

## 2025-05-29 NOTE — PROGRESS NOTES
Blood sugar was 109 and A1c shows you are still controlled at 6 so continue to watch your carbs and increase your walking bad cholesterol is 90 and goal is below 70 can you do more to decrease your saturated fats and increase your walking?  You are already on maximum dose of pravastatin-we could add Zetia but I am not sure if that would get you to goal please let us know

## 2025-07-05 RX ORDER — AMLODIPINE BESYLATE 10 MG/1
10 TABLET ORAL DAILY
Qty: 90 TABLET | Refills: 0 | Status: SHIPPED | OUTPATIENT
Start: 2025-07-05

## 2025-07-21 RX ORDER — DESMOPRESSIN ACETATE 0.2 MG/1
TABLET ORAL
Status: CANCELLED | OUTPATIENT
Start: 2025-07-21

## 2025-07-22 NOTE — TELEPHONE ENCOUNTER
As I do not have a lot of experience prescribing this drug on a continual basis.  I would need to see you every 3 months to recheck your serum sodium and kidney function.  If you are agreeable to that that I would agree to prescribe it on a 3-month basis for a year.  Please let us know and set up an appointment for labs in August.

## 2025-07-22 NOTE — TELEPHONE ENCOUNTER
Rx Refill Note  Requested Prescriptions     Pending Prescriptions Disp Refills    desmopressin (DDAVP) 0.2 MG tablet       Sig: Take  by mouth every night at bedtime.      Last office visit with prescribing clinician: 5/27/2025   Last telemedicine visit with prescribing clinician: Visit date not found   Next office visit with prescribing clinician: 12/1/2025                         Would you like a call back once the refill request has been completed: [] Yes [] No    If the office needs to give you a call back, can they leave a voicemail: [] Yes [] No    Kristi Maria MA  07/22/25, 07:51 EDT

## 2025-07-22 NOTE — TELEPHONE ENCOUNTER
Patient stopped in office to reiterate that he doesn't feel he should have to see Urology for this nor would he see them for this prescription.

## 2025-07-24 RX ORDER — DESMOPRESSIN ACETATE 0.2 MG/1
0.2 TABLET ORAL NIGHTLY
Qty: 90 TABLET | Refills: 0 | Status: SHIPPED | OUTPATIENT
Start: 2025-07-24

## 2025-07-24 NOTE — TELEPHONE ENCOUNTER
Please look at my last note and relay that to the patient apparently I did not send that to you to relay to the patient.

## 2025-07-31 RX ORDER — GABAPENTIN 600 MG/1
600 TABLET ORAL 3 TIMES DAILY
Qty: 270 TABLET | Refills: 0 | Status: SHIPPED | OUTPATIENT
Start: 2025-07-31

## 2025-08-09 RX ORDER — ALLOPURINOL 100 MG/1
100 TABLET ORAL DAILY
Qty: 90 TABLET | Refills: 0 | Status: SHIPPED | OUTPATIENT
Start: 2025-08-09

## 2025-08-19 ENCOUNTER — LAB (OUTPATIENT)
Dept: LAB | Facility: HOSPITAL | Age: 55
End: 2025-08-19
Payer: COMMERCIAL

## 2025-08-19 ENCOUNTER — TRANSCRIBE ORDERS (OUTPATIENT)
Dept: LAB | Facility: HOSPITAL | Age: 55
End: 2025-08-19
Payer: COMMERCIAL

## 2025-08-19 DIAGNOSIS — R35.1 NOCTURIA: ICD-10-CM

## 2025-08-19 DIAGNOSIS — N40.1 BENIGN PROSTATIC HYPERPLASIA WITH LOWER URINARY TRACT SYMPTOMS, SYMPTOM DETAILS UNSPECIFIED: Primary | ICD-10-CM

## 2025-08-19 DIAGNOSIS — N40.1 BENIGN PROSTATIC HYPERPLASIA WITH LOWER URINARY TRACT SYMPTOMS, SYMPTOM DETAILS UNSPECIFIED: ICD-10-CM

## 2025-08-19 LAB
ANION GAP SERPL CALCULATED.3IONS-SCNC: 11.4 MMOL/L (ref 5–15)
BUN SERPL-MCNC: 14.1 MG/DL (ref 6–20)
BUN/CREAT SERPL: 12.8 (ref 7–25)
CALCIUM SPEC-SCNC: 9.5 MG/DL (ref 8.6–10.5)
CHLORIDE SERPL-SCNC: 104 MMOL/L (ref 98–107)
CO2 SERPL-SCNC: 24.6 MMOL/L (ref 22–29)
CREAT SERPL-MCNC: 1.1 MG/DL (ref 0.76–1.27)
EGFRCR SERPLBLD CKD-EPI 2021: 78.8 ML/MIN/1.73
GLUCOSE SERPL-MCNC: 92 MG/DL (ref 65–99)
POTASSIUM SERPL-SCNC: 4.5 MMOL/L (ref 3.5–5.2)
PSA SERPL-MCNC: 0.55 NG/ML (ref 0–4)
SODIUM SERPL-SCNC: 140 MMOL/L (ref 136–145)

## 2025-08-19 PROCEDURE — 84153 ASSAY OF PSA TOTAL: CPT

## 2025-08-19 PROCEDURE — 80048 BASIC METABOLIC PNL TOTAL CA: CPT

## 2025-08-19 PROCEDURE — 36415 COLL VENOUS BLD VENIPUNCTURE: CPT

## 2025-08-22 RX ORDER — METFORMIN HYDROCHLORIDE 500 MG/1
500 TABLET, EXTENDED RELEASE ORAL
Qty: 90 TABLET | Refills: 0 | Status: SHIPPED | OUTPATIENT
Start: 2025-08-22

## (undated) DEVICE — BITEBLOCK ENDO W/STRAP 60F A/ LF DISP

## (undated) DEVICE — SNAR POLYP HOTSNARE/BRAIDED OVL/MINI 7F 2.8X10MM 230CM 1P/U

## (undated) DEVICE — PAPR PRNT PK SONY W RIBN UPC55

## (undated) DEVICE — PK ENDO GI 50

## (undated) DEVICE — TRAP WIDEEYE POLYP

## (undated) DEVICE — SINGLE-USE BIOPSY FORCEPS: Brand: RADIAL JAW 4